# Patient Record
Sex: FEMALE | Race: WHITE | NOT HISPANIC OR LATINO | Employment: FULL TIME | URBAN - METROPOLITAN AREA
[De-identification: names, ages, dates, MRNs, and addresses within clinical notes are randomized per-mention and may not be internally consistent; named-entity substitution may affect disease eponyms.]

---

## 2022-07-11 ENCOUNTER — HOSPITAL ENCOUNTER (OUTPATIENT)
Dept: LAB | Facility: MEDICAL CENTER | Age: 52
End: 2022-07-11
Attending: PHYSICIAN ASSISTANT
Payer: COMMERCIAL

## 2022-07-11 ENCOUNTER — HOSPITAL ENCOUNTER (OUTPATIENT)
Dept: RADIOLOGY | Facility: MEDICAL CENTER | Age: 52
End: 2022-07-11
Attending: PHYSICIAN ASSISTANT
Payer: COMMERCIAL

## 2022-07-11 ENCOUNTER — OFFICE VISIT (OUTPATIENT)
Dept: MEDICAL GROUP | Facility: LAB | Age: 52
End: 2022-07-11
Payer: COMMERCIAL

## 2022-07-11 VITALS
DIASTOLIC BLOOD PRESSURE: 70 MMHG | BODY MASS INDEX: 43.49 KG/M2 | RESPIRATION RATE: 16 BRPM | SYSTOLIC BLOOD PRESSURE: 126 MMHG | HEIGHT: 65 IN | TEMPERATURE: 98.6 F | HEART RATE: 73 BPM | OXYGEN SATURATION: 100 % | WEIGHT: 261 LBS

## 2022-07-11 DIAGNOSIS — I10 PRIMARY HYPERTENSION: Primary | ICD-10-CM

## 2022-07-11 DIAGNOSIS — G89.29 CHRONIC NECK PAIN: ICD-10-CM

## 2022-07-11 DIAGNOSIS — Z12.11 SCREENING FOR COLORECTAL CANCER: ICD-10-CM

## 2022-07-11 DIAGNOSIS — M54.2 CHRONIC NECK PAIN: ICD-10-CM

## 2022-07-11 DIAGNOSIS — Z00.00 PREVENTATIVE HEALTH CARE: ICD-10-CM

## 2022-07-11 DIAGNOSIS — Z12.12 SCREENING FOR COLORECTAL CANCER: ICD-10-CM

## 2022-07-11 DIAGNOSIS — E55.9 VITAMIN D DEFICIENCY: ICD-10-CM

## 2022-07-11 DIAGNOSIS — Z12.31 ENCOUNTER FOR SCREENING MAMMOGRAM FOR BREAST CANCER: ICD-10-CM

## 2022-07-11 LAB
25(OH)D3 SERPL-MCNC: 33 NG/ML (ref 30–100)
ALBUMIN SERPL BCP-MCNC: 4.2 G/DL (ref 3.2–4.9)
ALBUMIN/GLOB SERPL: 1.3 G/DL
ALP SERPL-CCNC: 86 U/L (ref 30–99)
ALT SERPL-CCNC: 18 U/L (ref 2–50)
ANION GAP SERPL CALC-SCNC: 11 MMOL/L (ref 7–16)
AST SERPL-CCNC: 16 U/L (ref 12–45)
BASOPHILS # BLD AUTO: 0.6 % (ref 0–1.8)
BASOPHILS # BLD: 0.05 K/UL (ref 0–0.12)
BILIRUB SERPL-MCNC: 0.3 MG/DL (ref 0.1–1.5)
BUN SERPL-MCNC: 16 MG/DL (ref 8–22)
CALCIUM SERPL-MCNC: 9.2 MG/DL (ref 8.5–10.5)
CHLORIDE SERPL-SCNC: 100 MMOL/L (ref 96–112)
CHOLEST SERPL-MCNC: 197 MG/DL (ref 100–199)
CO2 SERPL-SCNC: 26 MMOL/L (ref 20–33)
CREAT SERPL-MCNC: 0.71 MG/DL (ref 0.5–1.4)
EOSINOPHIL # BLD AUTO: 0.21 K/UL (ref 0–0.51)
EOSINOPHIL NFR BLD: 2.7 % (ref 0–6.9)
ERYTHROCYTE [DISTWIDTH] IN BLOOD BY AUTOMATED COUNT: 44.7 FL (ref 35.9–50)
EST. AVERAGE GLUCOSE BLD GHB EST-MCNC: 123 MG/DL
GFR SERPLBLD CREATININE-BSD FMLA CKD-EPI: 101 ML/MIN/1.73 M 2
GLOBULIN SER CALC-MCNC: 3.2 G/DL (ref 1.9–3.5)
GLUCOSE SERPL-MCNC: 101 MG/DL (ref 65–99)
HBA1C MFR BLD: 5.9 % (ref 4–5.6)
HCT VFR BLD AUTO: 45.2 % (ref 37–47)
HDLC SERPL-MCNC: 57 MG/DL
HGB BLD-MCNC: 14.7 G/DL (ref 12–16)
IMM GRANULOCYTES # BLD AUTO: 0.02 K/UL (ref 0–0.11)
IMM GRANULOCYTES NFR BLD AUTO: 0.3 % (ref 0–0.9)
LDLC SERPL CALC-MCNC: 117 MG/DL
LYMPHOCYTES # BLD AUTO: 2.3 K/UL (ref 1–4.8)
LYMPHOCYTES NFR BLD: 29.3 % (ref 22–41)
MCH RBC QN AUTO: 29.4 PG (ref 27–33)
MCHC RBC AUTO-ENTMCNC: 32.5 G/DL (ref 33.6–35)
MCV RBC AUTO: 90.4 FL (ref 81.4–97.8)
MONOCYTES # BLD AUTO: 0.62 K/UL (ref 0–0.85)
MONOCYTES NFR BLD AUTO: 7.9 % (ref 0–13.4)
NEUTROPHILS # BLD AUTO: 4.65 K/UL (ref 2–7.15)
NEUTROPHILS NFR BLD: 59.2 % (ref 44–72)
NRBC # BLD AUTO: 0 K/UL
NRBC BLD-RTO: 0 /100 WBC
PLATELET # BLD AUTO: 240 K/UL (ref 164–446)
PMV BLD AUTO: 10.2 FL (ref 9–12.9)
POTASSIUM SERPL-SCNC: 4.2 MMOL/L (ref 3.6–5.5)
PROT SERPL-MCNC: 7.4 G/DL (ref 6–8.2)
RBC # BLD AUTO: 5 M/UL (ref 4.2–5.4)
SODIUM SERPL-SCNC: 137 MMOL/L (ref 135–145)
TRIGL SERPL-MCNC: 117 MG/DL (ref 0–149)
TSH SERPL DL<=0.005 MIU/L-ACNC: 1.25 UIU/ML (ref 0.38–5.33)
WBC # BLD AUTO: 7.9 K/UL (ref 4.8–10.8)

## 2022-07-11 PROCEDURE — 82306 VITAMIN D 25 HYDROXY: CPT

## 2022-07-11 PROCEDURE — 99204 OFFICE O/P NEW MOD 45 MIN: CPT | Performed by: PHYSICIAN ASSISTANT

## 2022-07-11 PROCEDURE — 72040 X-RAY EXAM NECK SPINE 2-3 VW: CPT

## 2022-07-11 PROCEDURE — 85025 COMPLETE CBC W/AUTO DIFF WBC: CPT

## 2022-07-11 PROCEDURE — 80053 COMPREHEN METABOLIC PANEL: CPT

## 2022-07-11 PROCEDURE — 36415 COLL VENOUS BLD VENIPUNCTURE: CPT

## 2022-07-11 PROCEDURE — 83036 HEMOGLOBIN GLYCOSYLATED A1C: CPT

## 2022-07-11 PROCEDURE — 84443 ASSAY THYROID STIM HORMONE: CPT

## 2022-07-11 PROCEDURE — 80061 LIPID PANEL: CPT

## 2022-07-11 RX ORDER — METHOCARBAMOL 750 MG/1
750 TABLET, FILM COATED ORAL 4 TIMES DAILY
COMMUNITY
End: 2022-07-11 | Stop reason: SDUPTHER

## 2022-07-11 RX ORDER — TRAMADOL HYDROCHLORIDE 50 MG/1
50 TABLET ORAL 3 TIMES DAILY PRN
COMMUNITY
Start: 2022-05-12 | End: 2022-07-11 | Stop reason: SDUPTHER

## 2022-07-11 RX ORDER — METHOCARBAMOL 750 MG/1
750 TABLET, FILM COATED ORAL 4 TIMES DAILY
Qty: 120 TABLET | Refills: 1 | Status: SHIPPED | OUTPATIENT
Start: 2022-07-11 | End: 2022-12-14

## 2022-07-11 RX ORDER — ATENOLOL 100 MG/1
100 TABLET ORAL DAILY
COMMUNITY
Start: 2022-06-18 | End: 2022-12-20 | Stop reason: SDUPTHER

## 2022-07-11 RX ORDER — TRAMADOL HYDROCHLORIDE 50 MG/1
50 TABLET ORAL EVERY 8 HOURS PRN
Qty: 90 TABLET | Refills: 0 | Status: SHIPPED | OUTPATIENT
Start: 2022-07-11 | End: 2022-08-15 | Stop reason: SDUPTHER

## 2022-07-11 RX ORDER — CHLORTHALIDONE 25 MG/1
25 TABLET ORAL DAILY
COMMUNITY
Start: 2022-05-13 | End: 2022-12-20 | Stop reason: SDUPTHER

## 2022-07-11 NOTE — ASSESSMENT & PLAN NOTE
New to me; chronic  History of vertebral fracture, was in a C-collar for 1 year.   This injury occurred in 2008.   Then 6 months ago fell and is concerned that she re-injured her neck.     Chronic numbness into the R arm/hand.   Pain is worse at night.   History of seeing pain management and evaluation by surgeon many years ago. Was instructed to avoid surgery, PT and Chiro due to location of fractures.   Using Tramadol 1-2x/day.

## 2022-07-11 NOTE — PROGRESS NOTES
Kasia Casanova is a 51 y.o. female new patient here to establish care with new provider, hypertension and chronic neck pain  HPI:    Primary hypertension  New to me; chronic and stable on current regimen.   Denies chest pain or SOB.     Chronic neck pain  New to me; chronic  History of vertebral fracture, was in a C-collar for 1 year.   This injury occurred in 2008.   Then 6 months ago fell and is concerned that she re-injured her neck.     Chronic numbness into the R arm/hand.   Pain is worse at night.   History of seeing pain management and evaluation by surgeon many years ago. Was instructed to avoid surgery, PT and Chiro due to location of fractures.   Using Tramadol 1-2x/day.     Current medicines (including changes today)  Current Outpatient Medications   Medication Sig Dispense Refill   • traMADol (ULTRAM) 50 MG Tab Take 1 Tablet by mouth every 8 hours as needed for Moderate Pain or Severe Pain for up to 30 days. for pain 90 Tablet 0   • methocarbamol (ROBAXIN) 750 MG Tab Take 1 Tablet by mouth 4 times a day. 120 Tablet 1   • atenolol (TENORMIN) 100 MG Tab Take 100 mg by mouth every day.     • chlorthalidone (HYGROTON) 25 MG Tab Take 25 mg by mouth every day.       No current facility-administered medications for this visit.     She  has a past medical history of Hypertension.  She  has a past surgical history that includes primary c section.  Social History     Tobacco Use   • Smoking status: Never Smoker   • Smokeless tobacco: Never Used   Vaping Use   • Vaping Use: Never used   Substance Use Topics   • Alcohol use: Yes     Comment: rare   • Drug use: No     Social History     Social History Narrative   • Not on file     History reviewed. No pertinent family history.  No family status information on file.         ROS  Constitutional: Negative for fever, chills and malaise/fatigue.   HENT: Negative for congestion.    Eyes: Negative for pain.   Respiratory: Negative for cough and shortness of breath.   "  Cardiovascular: Negative for leg swelling.   Skin: Negative for rash.   Neurological: Negative for dizziness, focal weakness and headaches.   Endo/Heme/Allergies: Does not bruise/bleed easily.   Psychiatric/Behavioral: Negative for depression.  The patient is not nervous/anxious.    All other systems reviewed and are negative     Objective:     /70 (BP Location: Left arm, Patient Position: Sitting, BP Cuff Size: Large adult)   Pulse 73   Temp 37 °C (98.6 °F) (Temporal)   Resp 16   Ht 1.651 m (5' 5\")   Wt 118 kg (261 lb)   SpO2 100%  Body mass index is 43.43 kg/m².  Physical Exam:    Constitutional: Alert, no distress.  Skin: Warm, dry, good turgor, no rashes in visible areas.  Eye: Equal, round and reactive, conjunctiva clear, lids normal.  Neck: Trachea midline, no masses, no thyromegaly.  Respiratory: Unlabored respiratory effort, lungs clear to auscultation, no wheezes, no ronchi.  Cardiovascular: Normal S1, S2, no murmur, no edema.  Psych: Alert and oriented x3, normal affect and mood.      Assessment and Plan:   The following treatment plan was discussed    1. Primary hypertension  New to me; chronic and stable on current regimen.   No refills needed at this time.    2. Chronic neck pain  New to me, chronic neck pain status post injury many years ago..  Does feel like she reinjured her neck about 6 months ago.   We will obtain updated neck x-ray.  Refill for tramadol and methocarbamol as written.  Should continue IBU/Tylenol prn more mild to moderate pain.   Pt was educated on use and SEs of medication.  She is aware that these can cause drowsiness and are not to be used with any other sedating substance or alcohol.   Pt expressed verbal understanding. Aware that these are habit forming and he is to notify me if she is starting to use/need more/higher dose, in which case we would want to seek other forms of pain control.   - traMADol (ULTRAM) 50 MG Tab; Take 1 Tablet by mouth every 8 hours as " needed for Moderate Pain or Severe Pain for up to 30 days. for pain  Dispense: 90 Tablet; Refill: 0  - methocarbamol (ROBAXIN) 750 MG Tab; Take 1 Tablet by mouth 4 times a day.  Dispense: 120 Tablet; Refill: 1  - DX-CERVICAL SPINE-2 OR 3 VIEWS; Future    3. Preventative health care  New patient today, due for routine, fasting labs  - Lipid Profile; Future  - Comp Metabolic Panel; Future  - CBC WITH DIFFERENTIAL; Future  - HEMOGLOBIN A1C; Future  - TSH WITH REFLEX TO FT4; Future    4. Vitamin D deficiency  - VITAMIN D,25 HYDROXY; Future    5. Encounter for screening mammogram for breast cancer  - MA-SCREENING MAMMO BILAT W/TOMOSYNTHESIS W/CAD; Future    6. Screening for colorectal cancer  - COLOGUARD (FIT DNA)      Records requested.  Followup: Return in about 3 months (around 10/11/2022), or if symptoms worsen or fail to improve.       Martine Koch P.A.-C.  Supervising MD: Dr. Valentino Tee MD  07/11/22

## 2022-07-12 PROBLEM — E78.00 ELEVATED LDL CHOLESTEROL LEVEL: Status: ACTIVE | Noted: 2022-07-12

## 2022-07-12 PROBLEM — R73.03 PREDIABETES: Status: ACTIVE | Noted: 2022-07-12

## 2022-08-15 DIAGNOSIS — G89.29 CHRONIC NECK PAIN: ICD-10-CM

## 2022-08-15 DIAGNOSIS — M54.2 CHRONIC NECK PAIN: ICD-10-CM

## 2022-08-16 RX ORDER — TRAMADOL HYDROCHLORIDE 50 MG/1
50 TABLET ORAL EVERY 8 HOURS PRN
Qty: 90 TABLET | Refills: 0 | Status: SHIPPED | OUTPATIENT
Start: 2022-08-16 | End: 2022-09-09 | Stop reason: SDUPTHER

## 2022-09-09 ENCOUNTER — OFFICE VISIT (OUTPATIENT)
Dept: MEDICAL GROUP | Facility: LAB | Age: 52
End: 2022-09-09
Payer: COMMERCIAL

## 2022-09-09 VITALS
SYSTOLIC BLOOD PRESSURE: 124 MMHG | TEMPERATURE: 98.6 F | BODY MASS INDEX: 43.27 KG/M2 | DIASTOLIC BLOOD PRESSURE: 80 MMHG | HEART RATE: 60 BPM | RESPIRATION RATE: 18 BRPM | HEIGHT: 65 IN | OXYGEN SATURATION: 99 % | WEIGHT: 259.7 LBS

## 2022-09-09 DIAGNOSIS — G89.29 CHRONIC NECK PAIN: ICD-10-CM

## 2022-09-09 DIAGNOSIS — M54.2 CHRONIC NECK PAIN: ICD-10-CM

## 2022-09-09 PROCEDURE — 99213 OFFICE O/P EST LOW 20 MIN: CPT | Performed by: PHYSICIAN ASSISTANT

## 2022-09-09 RX ORDER — TRAMADOL HYDROCHLORIDE 50 MG/1
50 TABLET ORAL EVERY 8 HOURS PRN
Qty: 90 TABLET | Refills: 0 | Status: SHIPPED | OUTPATIENT
Start: 2022-09-16 | End: 2022-10-20 | Stop reason: SDUPTHER

## 2022-09-09 ASSESSMENT — FIBROSIS 4 INDEX: FIB4 SCORE: 0.83

## 2022-09-09 NOTE — ASSESSMENT & PLAN NOTE
Follow-up, chronic.  Unfortunately, pain is becoming worse.  Somewhat stable on tramadol 50 mg every 8 hours.  Initially was not interested in seeing pain management, however since pain seems to be worsening, she is more open to this referral

## 2022-09-09 NOTE — PROGRESS NOTES
"Subjective:   CC: Kasia Casanova is a 51 y.o. female here today for medication management    HPI:  Chronic neck pain  Follow-up, chronic.  Unfortunately, pain is becoming worse.  Somewhat stable on tramadol 50 mg every 8 hours.  Initially was not interested in seeing pain management, however since pain seems to be worsening, she is more open to this referral     Current medicines (including changes today)  Current Outpatient Medications   Medication Sig Dispense Refill    [START ON 9/16/2022] traMADol (ULTRAM) 50 MG Tab Take 1 Tablet by mouth every 8 hours as needed for Moderate Pain or Severe Pain for up to 30 days. for pain 90 Tablet 0    atenolol (TENORMIN) 100 MG Tab Take 100 mg by mouth every day.      chlorthalidone (HYGROTON) 25 MG Tab Take 25 mg by mouth every day.      methocarbamol (ROBAXIN) 750 MG Tab Take 1 Tablet by mouth 4 times a day. 120 Tablet 1     No current facility-administered medications for this visit.     She  has a past medical history of Hypertension.    ROS   No chest pain, no shortness of breath, no abdominal pain       Objective:     /80 (BP Location: Left arm, Patient Position: Sitting, BP Cuff Size: Large adult)   Pulse 60   Temp 37 °C (98.6 °F) (Temporal)   Resp 18   Ht 1.651 m (5' 5\")   Wt 118 kg (259 lb 11.2 oz)   SpO2 99%  Body mass index is 43.22 kg/m².   Physical Exam:  Constitutional: Alert, no distress.  Skin: Warm, dry, good turgor, no rashes in visible areas.  Eye: Equal, round, conjunctiva clear, lids normal.  Neck: Trachea midline, no masses,  Respiratory: Unlabored respiratory effort,  Psych: Alert and oriented x3, normal affect and mood.    Assessment and Plan:   The following treatment plan was discussed    1. Chronic neck pain  Follow-up, chronic.  X-ray was reviewed today.  PDMP has been reviewed.  Refill for tramadol as written.  Also recommend referral to pain management given worsening symptoms.  She is more open to this plan at this time.  Follow-up in " 1 month, of course sooner as needed  - Referral to Pain Management  - traMADol (ULTRAM) 50 MG Tab; Take 1 Tablet by mouth every 8 hours as needed for Moderate Pain or Severe Pain for up to 30 days. for pain  Dispense: 90 Tablet; Refill: 0  - Consent for Opiate Prescription  - Controlled Substance Treatment Agreement      Followup: Return in about 4 weeks (around 10/7/2022).       Martine Koch P.A.-C.  Supervising MD: Dr. Valentino Tee MD  09/09/22

## 2022-10-12 ENCOUNTER — HOSPITAL ENCOUNTER (OUTPATIENT)
Dept: RADIOLOGY | Facility: MEDICAL CENTER | Age: 52
End: 2022-10-12
Payer: COMMERCIAL

## 2022-10-20 DIAGNOSIS — G89.29 CHRONIC NECK PAIN: ICD-10-CM

## 2022-10-20 DIAGNOSIS — M54.2 CHRONIC NECK PAIN: ICD-10-CM

## 2022-10-20 NOTE — TELEPHONE ENCOUNTER
Received request via: Pharmacy    Was the patient seen in the last year in this department? Yes  9/9/22  Does the patient have an active prescription (recently filled or refills available) for medication(s) requested? No

## 2022-10-21 ENCOUNTER — HOSPITAL ENCOUNTER (OUTPATIENT)
Dept: RADIOLOGY | Facility: MEDICAL CENTER | Age: 52
End: 2022-10-21
Attending: PHYSICIAN ASSISTANT
Payer: COMMERCIAL

## 2022-10-21 ENCOUNTER — OFFICE VISIT (OUTPATIENT)
Dept: PHYSICAL MEDICINE AND REHAB | Facility: MEDICAL CENTER | Age: 52
End: 2022-10-21
Payer: COMMERCIAL

## 2022-10-21 VITALS
DIASTOLIC BLOOD PRESSURE: 88 MMHG | OXYGEN SATURATION: 97 % | HEIGHT: 65 IN | WEIGHT: 261.47 LBS | SYSTOLIC BLOOD PRESSURE: 138 MMHG | HEART RATE: 60 BPM | BODY MASS INDEX: 43.56 KG/M2 | TEMPERATURE: 98.7 F

## 2022-10-21 DIAGNOSIS — Z87.81 H/O CERVICAL FRACTURE: ICD-10-CM

## 2022-10-21 DIAGNOSIS — R20.2 NUMBNESS AND TINGLING IN BOTH HANDS: ICD-10-CM

## 2022-10-21 DIAGNOSIS — M79.10 MYALGIA: ICD-10-CM

## 2022-10-21 DIAGNOSIS — M75.41 IMPINGEMENT SYNDROME OF RIGHT SHOULDER: ICD-10-CM

## 2022-10-21 DIAGNOSIS — R20.0 NUMBNESS AND TINGLING IN BOTH HANDS: ICD-10-CM

## 2022-10-21 DIAGNOSIS — M54.2 CERVICALGIA: ICD-10-CM

## 2022-10-21 DIAGNOSIS — Z12.31 ENCOUNTER FOR SCREENING MAMMOGRAM FOR BREAST CANCER: ICD-10-CM

## 2022-10-21 PROCEDURE — 77063 BREAST TOMOSYNTHESIS BI: CPT

## 2022-10-21 PROCEDURE — 99204 OFFICE O/P NEW MOD 45 MIN: CPT | Performed by: STUDENT IN AN ORGANIZED HEALTH CARE EDUCATION/TRAINING PROGRAM

## 2022-10-21 RX ORDER — TIZANIDINE 2 MG/1
1-2 TABLET ORAL NIGHTLY PRN
Qty: 30 TABLET | Refills: 2 | Status: SHIPPED | OUTPATIENT
Start: 2022-10-21 | End: 2023-02-11 | Stop reason: SDUPTHER

## 2022-10-21 RX ORDER — TRAMADOL HYDROCHLORIDE 50 MG/1
50 TABLET ORAL EVERY 4 HOURS PRN
COMMUNITY
End: 2022-12-14

## 2022-10-21 RX ORDER — TRAMADOL HYDROCHLORIDE 50 MG/1
50 TABLET ORAL EVERY 8 HOURS PRN
Qty: 90 TABLET | Refills: 0 | Status: SHIPPED | OUTPATIENT
Start: 2022-10-21 | End: 2022-11-20

## 2022-10-21 RX ORDER — M-VIT,TX,IRON,MINS/CALC/FOLIC 27MG-0.4MG
1 TABLET ORAL DAILY
COMMUNITY

## 2022-10-21 ASSESSMENT — PAIN SCALES - GENERAL: PAINLEVEL: 5=MODERATE PAIN

## 2022-10-21 ASSESSMENT — FIBROSIS 4 INDEX: FIB4 SCORE: 0.83

## 2022-10-21 ASSESSMENT — PATIENT HEALTH QUESTIONNAIRE - PHQ9: CLINICAL INTERPRETATION OF PHQ2 SCORE: 0

## 2022-10-21 NOTE — PROGRESS NOTES
New Patient Note    Interventional Pain and Spine  Physiatry (Physical Medicine and Rehabilitation)     Patient Name: Kasia Casanova  : 1970  Date of Service: 10/21/2022  PCP: Martine Koch P.A.-C.  Referring Provider: Martine Koch,*    Chief Complaint:   Chief Complaint   Patient presents with    New Patient     Chronic neck pain       HPI  HISTORY (10/21/2022):  Kasia Casanova is a 51 y.o. RHD female  who presents today with neck pain. She endorses a sensation of a stabbing pain at the back of her neck near the bony prominence. Neck pain started after sustaining burst fx of C7 from a diving injury around 15 years ago. She remembers a surgeon at that time told her that her fx was nonoperable and she has managed this conservatively.    Pain right now is 5/10 on the numeric pain scale. Her pain at its best-worse level during the course of the day is typically 4-7/10, respectively.  Pain worsens with sitting, standing, walking, coughing, and sneezing and improves with laying down. Pain worsens at night.  Her pain interferes somewhat with ADLs and ability to join her family on quads and ability to sleep. The patient endorses new right shoulder pain and numbness/tingling in her fingers (R>L) that seems to be worsening. States she has numbness worst at her right index finger. Denies radiating pain from her neck to her hands. Has not had an EMG before.     The patient has done physical therapy for this problem, most recently years ago. Not doing home exercise program.     Patient has tried the following medications with varied success (current meds in bold):   Tramadol 50mg PRN (has been gradually weaned over the years) typically takes 1-2 tabs per day as prescribed by PCP, sometimes 3 tabs per day when needed  Tylenol - SE increased liver enzymes  Ibuprofen - SE GI upset  Robaxin - no benefit  Tizanidine (unknown dose) - benefit in the past, unwanted SE drowsiness at unknown  dose    Therapeutic modalities and interventional therapies to date include:  -no injections  No massage or chiropractic    Medical history includes hypertension, prediabetes    Psychological testing for pain as depression and pain commonly coexist and need to both be treated.     Opioid Risk Score: 0      Interpretation of Opioid Risk Score   Score 0-3 = Low risk of abuse. Do UDS at least once per year.  Score 4-7 = Moderate risk of abuse. Do UDS 1-4 times per year.  Score 8+ = High risk of abuse. Refer to specialist.    PHQ  Depression Screen (PHQ-2/PHQ-9) 10/21/2022   PHQ-2 Total Score 0       Interpretation of PHQ-9 Total Score   Score Severity   1-4 No Depression   5-9 Mild Depression   10-14 Moderate Depression   15-19 Moderately Severe Depression   20-27 Severe Depression      Medical records review:  I reviewed the note from the referring provider Martine Koch,* including the note dated 22.    ROS:   Red Flags ROS:   Fever, Chills, Sweats: Denies  Involuntary Weight Loss: Denies  Bladder Incontinence: Denies  Bowel Incontinence: denies  Saddle Anesthesia: Denies    All other systems reviewed and negative.     PMHx:   Past Medical History:   Diagnosis Date    Hypertension        PSHx:   Past Surgical History:   Procedure Laterality Date    PRIMARY C SECTION             Family Hx:   History reviewed. No pertinent family history.    Social Hx:  Social History     Socioeconomic History    Marital status:      Spouse name: Not on file    Number of children: Not on file    Years of education: Not on file    Highest education level: Not on file   Occupational History    Not on file   Tobacco Use    Smoking status: Never    Smokeless tobacco: Never   Vaping Use    Vaping Use: Never used   Substance and Sexual Activity    Alcohol use: Yes     Comment: less than 1 per week    Drug use: No    Sexual activity: Not on file   Other Topics Concern    Not on file   Social History Narrative     Not on file     Social Determinants of Health     Financial Resource Strain: Not on file   Food Insecurity: Not on file   Transportation Needs: Not on file   Physical Activity: Not on file   Stress: Not on file   Social Connections: Not on file   Intimate Partner Violence: Not on file   Housing Stability: Not on file       Allergies:  No Known Allergies    Medications: reviewed on epic.   Outpatient Medications Marked as Taking for the 10/21/22 encounter (Office Visit) with Cristina Raymond M.D.   Medication Sig Dispense Refill    traMADol (ULTRAM) 50 MG Tab Take 50 mg by mouth every four hours as needed.      therapeutic multivitamin-minerals (THERAGRAN-M) Tab Take 1 Tablet by mouth every day.      tizanidine (ZANAFLEX) 2 MG tablet Take 0.5-1 Tablets by mouth at bedtime as needed (muscle spasms). 30 Tablet 2    atenolol (TENORMIN) 100 MG Tab Take 100 mg by mouth every day.      chlorthalidone (HYGROTON) 25 MG Tab Take 25 mg by mouth every day.      methocarbamol (ROBAXIN) 750 MG Tab Take 1 Tablet by mouth 4 times a day. 120 Tablet 1        Current Outpatient Medications on File Prior to Visit   Medication Sig Dispense Refill    traMADol (ULTRAM) 50 MG Tab Take 50 mg by mouth every four hours as needed.      therapeutic multivitamin-minerals (THERAGRAN-M) Tab Take 1 Tablet by mouth every day.      atenolol (TENORMIN) 100 MG Tab Take 100 mg by mouth every day.      chlorthalidone (HYGROTON) 25 MG Tab Take 25 mg by mouth every day.      methocarbamol (ROBAXIN) 750 MG Tab Take 1 Tablet by mouth 4 times a day. 120 Tablet 1    traMADol (ULTRAM) 50 MG Tab Take 1 Tablet by mouth every 8 hours as needed for Moderate Pain or Severe Pain for up to 30 days. for pain 90 Tablet 0     No current facility-administered medications on file prior to visit.         EXAMINATION     Physical Exam:   /88 (BP Location: Right arm, Patient Position: Sitting, BP Cuff Size: Adult long)   Pulse 60   Temp 37.1 °C (98.7 °F) (Temporal)    "Ht 1.651 m (5' 5\")   Wt 119 kg (261 lb 7.5 oz)   SpO2 97%     Constitutional:   Body Habitus: Body mass index is 43.51 kg/m².  Cooperation: Fully cooperates with exam  Appearance: Well-groomed, well-nourished.    Eyes: No scleral icterus to suggest severe liver disease, no proptosis to suggest severe hyperthyroidism    ENT -no obvious auditory deficits, no noticeable facial droop     Skin -no rashes or lesions noted     Respiratory-  breathing comfortably on room air, no audible wheezing    Cardiovascular-distal extremities warm and well perfused.  No lower extremity edema is noted.     Gastrointestinal - no obvious abdominal masses, non-distended    Psychiatric- alert and oriented ×3. Normal affect.     Gait - normal gait, no use of ambulatory device, nonantalgic.  Tandem gait and heel walking and toe walking intact.    Musculoskeletal and Neuro -     Cervical spine   Inspection: No deformities of the skin over the cervical spine. No rashes or lesions.    limited active range of motion in all directions especially sidebending to the left and left cervical extension    Spurling's sign  positive on left, negative on right  Cervical facet loading maneuver  negative bilaterally    No signs of muscular atrophy in bilateral upper extremities     Tenderness to palpation at  over C7 spinous process , bilateral paracervical muscles, bilateral upper trapezius.     Key points for the international standards for neurological classification of spinal cord injury (ISNCSCI) to light touch.     Dermatome R L   C4 2 2   C5 2 2   C6 2 2   C7 2 2   C8 2 2   T1 2 2   T2 2 2       Motor Exam Upper Extremities   ? Myotome R L   Shoulder abduction C5 5 5   Elbow flexion C5 5 5   Wrist extension C6 5 5   Elbow extension C7 5 5   Finger flexion C8 5 5   Finger abduction T1 5 5     Reflexes  ?  R L   Biceps  2+ 2+   Brachioradialis  2+ 2+     Armas's sign negative bilaterally     Bilateral hands:   Inspection: No swelling, deformities, " or rashes. Symmetric appearing thenar and hyperthenar regions bilaterally.    Special tests:  Tinel's at the wrist over the median nerve negative bilaterally  Carpal tunnel compression: negative bilaterally  Phalen's test: negative bilaterally  Tinel's at the cubital tunnel: negative bilaterally    RIGHT SHOULDER EXAM  Inspection: no evidence of muscular atrophy or scapular winging. No evidence of erythema, effusion, or bruising. Humerus is not grossly high-riding when comparing the right and left sides  Palpation: tenderness to palpation at supraspinatus, otherwise no tenderness to palpation at anterior glenohumeral joint, posterior glenohumeral joint, greater tuberosity, lesser tuberosity, acromioclavicular joint, and bicipital groove.     Active ROM: Full with flexion, abduction, internal rotation, external rotation    Provocative testing:   RIGHT   Neer -   Monique +   Drop arm -   Empty can +   Resisted external rotation -   Resisted internal rotation -   Lift off test -   Speeds -   Yergasons -   Crossed body adduction -   O'Briens -       MEDICAL DECISION MAKING    Medical records review: see under HPI section.     DATA    Labs: Personally reviewed at today's visit:     Lab Results   Component Value Date/Time    SODIUM 137 07/11/2022 10:54 AM    POTASSIUM 4.2 07/11/2022 10:54 AM    CHLORIDE 100 07/11/2022 10:54 AM    CO2 26 07/11/2022 10:54 AM    ANION 11.0 07/11/2022 10:54 AM    GLUCOSE 101 (H) 07/11/2022 10:54 AM    BUN 16 07/11/2022 10:54 AM    CREATININE 0.71 07/11/2022 10:54 AM    CALCIUM 9.2 07/11/2022 10:54 AM    ASTSGOT 16 07/11/2022 10:54 AM    ALTSGPT 18 07/11/2022 10:54 AM    TBILIRUBIN 0.3 07/11/2022 10:54 AM    ALBUMIN 4.2 07/11/2022 10:54 AM    TOTPROTEIN 7.4 07/11/2022 10:54 AM    GLOBULIN 3.2 07/11/2022 10:54 AM    AGRATIO 1.3 07/11/2022 10:54 AM       No results found for: PROTHROMBTM, INR     Lab Results   Component Value Date/Time    WBC 7.9 07/11/2022 10:54 AM    RBC 5.00 07/11/2022 10:54  AM    HEMOGLOBIN 14.7 07/11/2022 10:54 AM    HEMATOCRIT 45.2 07/11/2022 10:54 AM    MCV 90.4 07/11/2022 10:54 AM    MCH 29.4 07/11/2022 10:54 AM    MCHC 32.5 (L) 07/11/2022 10:54 AM    MPV 10.2 07/11/2022 10:54 AM    NEUTSPOLYS 59.20 07/11/2022 10:54 AM    LYMPHOCYTES 29.30 07/11/2022 10:54 AM    MONOCYTES 7.90 07/11/2022 10:54 AM    EOSINOPHILS 2.70 07/11/2022 10:54 AM    BASOPHILS 0.60 07/11/2022 10:54 AM        Lab Results   Component Value Date/Time    HBA1C 5.9 (H) 07/11/2022 10:54 AM        Imaging:   I personally reviewed following images, these are my reads  MRI cervical spine 3/19/2008  C7 burst fracture with slight cord abutment.  Small central disc protrusion at C5-6 with slight cord abutment. Moderate bilateral neuroforaminal stenosis at C5-6. See formal radiology report for further details.    X-ray cervical spine 7/11/2022  Mild anterior listhesis of C2 on C3, C3 on C4, and C4 on C5.  Multilevel facet arthropathy. See formal radiology report for further details.        IMAGING radiology reads. I reviewed the following radiology reads                  Results for orders placed during the hospital encounter of 03/19/08    MR-CERVICAL SPINE-W/O    Impression  IMPRESSION:    1. CHRONIC MILD TO MODERATE C7 BURST FRACTURE WITH RETROPULSION OF THE  POSTERIOR SUPERIOR ASPECT OF THE C7 VERTEBRAL BODY WHICH ABUTS THE  VENTRAL ASPECT OF THE CORD WITHOUT SIGNIFICANT INTERVAL CHANGE SINCE  PREVIOUS EXAM EXCEPT FOR DECREASED BONE MARROW EDEMA ON THE CURRENT EXAM.    2. SMALL CENTRAL DISC PROTRUSION AT THE C5-6 LEVEL WHICH ABUTS THE  VENTRAL SURFACE OF THE CORD WITHOUT SIGNIFICANT INTERVAL CHANGE SINCE THE  PREVIOUS EXAM.      AJB/llw                                                 Results for orders placed during the hospital encounter of 07/11/22    DX-CERVICAL SPINE-2 OR 3 VIEWS    Impression  1.  Multilevel degenerative disc disease and facet degeneration.    2.  Mild multilevel degenerative subluxation.    3.   Limited visualization of the patient's known chronic C7 fracture due to obscuration by overlapping soft tissues on lateral view.                                                Diagnosis  Visit Diagnoses     ICD-10-CM   1. Numbness and tingling in both hands  R20.0    R20.2   2. Impingement syndrome of right shoulder  M75.41   3. Cervicalgia  M54.2   4. Myalgia  M79.10   5. H/O C7 burst fracture  Z87.81         ASSESSMENT AND PLAN:  Kasia Casanova ( 1970) is a female with history of C7 burst fracture approximately 15 years ago who presents with worsening neck pain and numbness and tingling in bilateral fingers with a positive left Spurling's.  She also has significant myofascial and myalgia pain at her bilateral paracervical muscles and upper trapezius.     Kasia was seen today for new patient.    Diagnoses and all orders for this visit:    Numbness and tingling in both hands  -     MR-CERVICAL SPINE-W/O; Future    Impingement syndrome of right shoulder    Cervicalgia  -     MR-CERVICAL SPINE-W/O; Future    Myalgia  -     Referral to Pain Clinic    H/O C7 burst fracture  -     MR-CERVICAL SPINE-W/O; Future    Other orders  -     tizanidine (ZANAFLEX) 2 MG tablet; Take 0.5-1 Tablets by mouth at bedtime as needed (muscle spasms).        PLAN  Physical Therapy: Discussed my recommendation for physical therapy referral, patient declined stating she would rather not go due to logistical reasons of PT being relatively unavailable near her in [place]    Diagnostic workup: as above and Personally reviewed at today's visit: MRI cervical spine 3/19/2008 and X-ray cervical spine 2022    Medications:   - given the myofascial component of pain, I will start tizanidine as above.  She does not remember which dose of tizanidine she took in the past which made her feel drowsy.  Discussed that I will prescribe a small dose, 2 mg, and she may start by taking 1 mg at bedtime.  Counseled on possible side effect of  drowsiness and dizziness and discussed that the patient should discontinue this if the side effects are too severe.    -Discussed that at this time my office is not accepting new referrals for chronic narcotic management.  It seems that tramadol 50 mg daily-3 times daily as needed as prescribed by her PCP who just retired has enabled her to perform ADLs and she has been on this medication for 15 years.  States that she has about 30 tablets left and she does not have an appointment with her new PCP yet.  Discussed that I will message her PCP with a summary of today's visit    Interventions:   Trigger point injections under ultrasound guidance. The risks, benefits, and alternatives to this procedure were discussed and the patient wishes to proceed with the procedure. Risks include but are not limited to damage to surrounding structures, infection, bleeding, worsening of pain which can be permanent, and collapsed lung. Benefits include pain relief and improved function. Alternatives include not doing the procedure.  -Could consider other cervical interventions pending MRI review    Other   - Discussed that we could consider EMG if MRI unrevealing for a potential cause of numbness/tingling in her fingers (R>L)   - Messaged her new PCP Latoya IGLESIAS with a summary of today's visit.     Follow-up: 2 weeks for trigger point injections above    Orders Placed This Encounter    MR-CERVICAL SPINE-W/O    Referral to Pain Clinic    traMADol (ULTRAM) 50 MG Tab    therapeutic multivitamin-minerals (THERAGRAN-M) Tab    tizanidine (ZANAFLEX) 2 MG tablet       Cristina Raymond MD  Interventional Pain and Spine  Physical Medicine and Rehabilitation  [facility]    CC Latoya IGLESIAS    The above note documents my personal evaluation of this patient. In addition, I have reviewed and confirmed with the patient and MA the supportive information documented in today's Patient Health Questionnaire and Office Note.     Please note  that this dictation was created using voice recognition software. I have made every reasonable attempt to correct obvious errors, but I expect that there are errors of grammar and possibly content that I did not discover before finalizing the note.

## 2022-10-21 NOTE — Clinical Note
Chauncey Klein,  I just met a new patient Kasia today who was referred by Martine Koch. Kasia mentioned that she will be establishing care with you as her new PCP since Martine has left.  She states that Martine has prescribed her tramadol for years (50mg daily-TID PRN) and she was under the impression that either I or a new PCP would take over this prescription.  I discussed that at this time my office is not accepting referrals for chronic narcotic management.  She states she has about 30 days left.    I encouraged her to schedule an appointment with you within that timeframe.  Please let me know if there would be any issues getting her in in that timeframe.  If necessary, I can prescribe her a short course of tramadol until she could see you.  In regards other aspects of her care, I will be seeing her back in a few weeks for trigger point injections.  I ordered a cervical MRI to assess for cervical nerve impingement to help guide our management. I also prescribed tizanidine.  Thanks, Crisitna

## 2022-11-19 ENCOUNTER — HOSPITAL ENCOUNTER (OUTPATIENT)
Dept: RADIOLOGY | Facility: MEDICAL CENTER | Age: 52
End: 2022-11-19
Attending: STUDENT IN AN ORGANIZED HEALTH CARE EDUCATION/TRAINING PROGRAM
Payer: COMMERCIAL

## 2022-11-19 DIAGNOSIS — R20.0 NUMBNESS AND TINGLING IN BOTH HANDS: ICD-10-CM

## 2022-11-19 DIAGNOSIS — M54.2 CERVICALGIA: ICD-10-CM

## 2022-11-19 DIAGNOSIS — R20.2 NUMBNESS AND TINGLING IN BOTH HANDS: ICD-10-CM

## 2022-11-19 DIAGNOSIS — Z87.81 H/O CERVICAL FRACTURE: ICD-10-CM

## 2022-11-19 PROCEDURE — 72141 MRI NECK SPINE W/O DYE: CPT

## 2022-11-22 ENCOUNTER — TELEPHONE (OUTPATIENT)
Dept: PHYSICAL MEDICINE AND REHAB | Facility: MEDICAL CENTER | Age: 52
End: 2022-11-22
Payer: COMMERCIAL

## 2022-11-22 NOTE — TELEPHONE ENCOUNTER
Phone Number Called: [phone]    Call outcome: Spoke to patient regarding message below.    Message: Called pt in effort to schedule MRI review, but she already had an appt sooner than anything else currently available, on 12/2 intended for TPI.     Kasia did ask if we could relay any information regarding her MR Cervical Spine findings via [facility] message or phone prior to then. I let her know we would get back to her with Dr. Raymond's thoughts.

## 2022-11-23 NOTE — TELEPHONE ENCOUNTER
I reviewed her MRI and there is nothing concerning that I think needs to be addressed prior to 12/2. The height of her chronic burst fracture has improved since her last MRI which is good news. We can review the images together at her next appt

## 2022-12-02 ENCOUNTER — OFFICE VISIT (OUTPATIENT)
Dept: PHYSICAL MEDICINE AND REHAB | Facility: MEDICAL CENTER | Age: 52
End: 2022-12-02
Payer: COMMERCIAL

## 2022-12-02 VITALS
WEIGHT: 261.69 LBS | SYSTOLIC BLOOD PRESSURE: 136 MMHG | HEART RATE: 57 BPM | DIASTOLIC BLOOD PRESSURE: 86 MMHG | OXYGEN SATURATION: 98 % | HEIGHT: 65 IN | BODY MASS INDEX: 43.6 KG/M2 | TEMPERATURE: 97.7 F

## 2022-12-02 DIAGNOSIS — M79.10 MYALGIA: Primary | ICD-10-CM

## 2022-12-02 PROCEDURE — 76942 ECHO GUIDE FOR BIOPSY: CPT | Performed by: STUDENT IN AN ORGANIZED HEALTH CARE EDUCATION/TRAINING PROGRAM

## 2022-12-02 PROCEDURE — 20553 NJX 1/MLT TRIGGER POINTS 3/>: CPT | Performed by: STUDENT IN AN ORGANIZED HEALTH CARE EDUCATION/TRAINING PROGRAM

## 2022-12-02 PROCEDURE — 99214 OFFICE O/P EST MOD 30 MIN: CPT | Mod: 25 | Performed by: STUDENT IN AN ORGANIZED HEALTH CARE EDUCATION/TRAINING PROGRAM

## 2022-12-02 ASSESSMENT — PATIENT HEALTH QUESTIONNAIRE - PHQ9: CLINICAL INTERPRETATION OF PHQ2 SCORE: 0

## 2022-12-02 ASSESSMENT — FIBROSIS 4 INDEX: FIB4 SCORE: 0.85

## 2022-12-02 ASSESSMENT — PAIN SCALES - GENERAL: PAINLEVEL: 5=MODERATE PAIN

## 2022-12-02 NOTE — PROGRESS NOTES
Follow-up patient Note    Interventional Pain and Spine  Physiatry (Physical Medicine and Rehabilitation)     Patient Name: Kasia Casanova  : 1970  Date of service: 2022    Chief Complaint:   Chief Complaint   Patient presents with    Follow-Up     Neck pain     HISTORY (10/21/2022):  Kasia Casanova is a 51 y.o. RHD female  who presents today with neck pain. She endorses a sensation of a stabbing pain at the back of her neck near the bony prominence. Neck pain started after sustaining burst fx of C7 from a diving injury around 15 years ago. She remembers a surgeon at that time told her that her fx was nonoperable and she has managed this conservatively.     Pain right now is 5/10 on the numeric pain scale. Her pain at its best-worse level during the course of the day is typically 4-7/10, respectively.  Pain worsens with sitting, standing, walking, coughing, and sneezing and improves with laying down. Pain worsens at night.  Her pain interferes somewhat with ADLs and ability to join her family on quads and ability to sleep. The patient endorses new right shoulder pain and numbness/tingling in her fingers (R>L) that seems to be worsening. States she has numbness worst at her right index finger. Denies radiating pain from her neck to her hands. Has not had an EMG before.      The patient has done physical therapy for this problem, most recently years ago. Not doing home exercise program.      Patient has tried the following medications with varied success (current meds in bold):   Tramadol 50mg PRN (has been gradually weaned over the years) typically takes 1-2 tabs per day as prescribed by PCP, sometimes 3 tabs per day when needed  Tylenol - SE increased liver enzymes  Ibuprofen - SE GI upset  Robaxin - no benefit  Tizanidine (unknown dose) - benefit in the past, unwanted SE drowsiness at unknown dose     Therapeutic modalities and interventional therapies to date include:  -no  injections  No massage or chiropractic     Medical history includes hypertension, prediabetes       HPI  Today's visit   Kasia Casanova ( 1970) is a female with The encounter diagnosis was Myalgia.    Notes ongoing pain at upper back and impaired sensation at bilateral index fingers, worst on right that is overall manageable. Denies significant pain or focal weakness.     Taking tizanidine with some side effects of vivid dreams, overall tolerable and helping her sleep. Would like to continue.    Pain severity 5/10 currently  Pt denies new numbness, tingling, or weakness.      ROS:   Red Flags ROS:   Fever, Chills, Sweats: Denies  Involuntary Weight Loss: Denies  Bladder Incontinence: Denies  Bowel Incontinence: denies  Saddle Anesthesia: Denies    All other systems reviewed and negative.     PMHx:   Past Medical History:   Diagnosis Date    Hypertension        PSHx:   Past Surgical History:   Procedure Laterality Date    PRIMARY C SECTION             Family Hx:   History reviewed. No pertinent family history.    Social Hx:  Social History     Socioeconomic History    Marital status:      Spouse name: Not on file    Number of children: Not on file    Years of education: Not on file    Highest education level: Not on file   Occupational History    Not on file   Tobacco Use    Smoking status: Never    Smokeless tobacco: Never   Vaping Use    Vaping Use: Never used   Substance and Sexual Activity    Alcohol use: Yes     Comment: less than 1 per week    Drug use: No    Sexual activity: Not on file   Other Topics Concern    Not on file   Social History Narrative    Not on file     Social Determinants of Health     Financial Resource Strain: Not on file   Food Insecurity: Not on file   Transportation Needs: Not on file   Physical Activity: Not on file   Stress: Not on file   Social Connections: Not on file   Intimate Partner Violence: Not on file   Housing Stability: Not on file  "      Allergies:  No Known Allergies    Medications: reviewed on epic.   Outpatient Medications Marked as Taking for the 12/2/22 encounter (Office Visit) with Cristina Raymond M.D.   Medication Sig Dispense Refill    traMADol (ULTRAM) 50 MG Tab Take 50 mg by mouth every four hours as needed.      therapeutic multivitamin-minerals (THERAGRAN-M) Tab Take 1 Tablet by mouth every day.      tizanidine (ZANAFLEX) 2 MG tablet Take 0.5-1 Tablets by mouth at bedtime as needed (muscle spasms). 30 Tablet 2    atenolol (TENORMIN) 100 MG Tab Take 100 mg by mouth every day.      chlorthalidone (HYGROTON) 25 MG Tab Take 25 mg by mouth every day.      methocarbamol (ROBAXIN) 750 MG Tab Take 1 Tablet by mouth 4 times a day. 120 Tablet 1        Current Outpatient Medications on File Prior to Visit   Medication Sig Dispense Refill    traMADol (ULTRAM) 50 MG Tab Take 50 mg by mouth every four hours as needed.      therapeutic multivitamin-minerals (THERAGRAN-M) Tab Take 1 Tablet by mouth every day.      tizanidine (ZANAFLEX) 2 MG tablet Take 0.5-1 Tablets by mouth at bedtime as needed (muscle spasms). 30 Tablet 2    atenolol (TENORMIN) 100 MG Tab Take 100 mg by mouth every day.      chlorthalidone (HYGROTON) 25 MG Tab Take 25 mg by mouth every day.      methocarbamol (ROBAXIN) 750 MG Tab Take 1 Tablet by mouth 4 times a day. 120 Tablet 1     No current facility-administered medications on file prior to visit.         EXAMINATION     Physical Exam:   /86 (BP Location: Right arm, Patient Position: Sitting, BP Cuff Size: Adult)   Pulse (!) 57   Temp 36.5 °C (97.7 °F) (Temporal)   Ht 1.651 m (5' 5\")   Wt 119 kg (261 lb 11 oz)   SpO2 98%     Constitutional:   Body Habitus: Body mass index is 43.55 kg/m².  Cooperation: Fully cooperates with exam  Appearance: Well-groomed, well-nourished.    Eyes: No scleral icterus to suggest severe liver disease, no proptosis to suggest severe hyperthyroidism    ENT -no obvious auditory deficits, " no noticeable facial droop     Skin -no rashes or lesions noted     Respiratory-  breathing comfortably on room air, no audible wheezing    Cardiovascular-distal extremities warm and well perfused.  No lower extremity edema is noted.     Gastrointestinal - no obvious abdominal masses, non-distended    Psychiatric- alert and oriented ×3. Normal affect.     Gait - normal gait, no use of ambulatory device, nonantalgic.       Musculoskeletal and Neuro -      Cervical spine   Inspection: No deformities of the skin over the cervical spine. No rashes or lesions.     No signs of muscular atrophy in bilateral upper extremities      Tenderness to palpation at  over C7 spinous process , bilateral paracervical muscles, bilateral upper trapezius.      Key points for the international standards for neurological classification of spinal cord injury (ISNCSCI) to light touch.      Dermatome R L   C4 2 2   C5 2 2   C6 1 1   C7 2 2   C8 2 2   T1 2 2   T2 2 2      At least antigravity strength in bilateral UE     Previous exam  Tandem gait and heel walking and toe walking intact.    limited active range of motion in all directions especially sidebending to the left and left cervical extension     Spurling's sign  positive on left, negative on right  Cervical facet loading maneuver  negative bilaterally       Motor Exam Upper Extremities   ? Myotome R L   Shoulder abduction C5 5 5   Elbow flexion C5 5 5   Wrist extension C6 5 5   Elbow extension C7 5 5   Finger flexion C8 5 5   Finger abduction T1 5 5      Reflexes  ?   R L   Biceps   2+ 2+   Brachioradialis   2+ 2+      Armas's sign negative bilaterally      Bilateral hands:   Inspection: No swelling, deformities, or rashes. Symmetric appearing thenar and hyperthenar regions bilaterally.     Special tests:  Tinel's at the wrist over the median nerve negative bilaterally  Carpal tunnel compression: negative bilaterally  Phalen's test: negative bilaterally  Tinel's at the cubital  tunnel: negative bilaterally     RIGHT SHOULDER EXAM  Inspection: no evidence of muscular atrophy or scapular winging. No evidence of erythema, effusion, or bruising. Humerus is not grossly high-riding when comparing the right and left sides  Palpation: tenderness to palpation at supraspinatus, otherwise no tenderness to palpation at anterior glenohumeral joint, posterior glenohumeral joint, greater tuberosity, lesser tuberosity, acromioclavicular joint, and bicipital groove.      Active ROM: Full with flexion, abduction, internal rotation, external rotation     Provocative testing:    RIGHT   Neer -   Monique +   Drop arm -   Empty can +   Resisted external rotation -   Resisted internal rotation -   Lift off test -   Speeds -   Yergasons -   Crossed body adduction -   O'Briens -          MEDICAL DECISION MAKING    Medical records review: see under HPI section.     DATA    Labs: No new labs available for review since last visit.   Lab Results   Component Value Date/Time    SODIUM 137 07/11/2022 10:54 AM    POTASSIUM 4.2 07/11/2022 10:54 AM    CHLORIDE 100 07/11/2022 10:54 AM    CO2 26 07/11/2022 10:54 AM    ANION 11.0 07/11/2022 10:54 AM    GLUCOSE 101 (H) 07/11/2022 10:54 AM    BUN 16 07/11/2022 10:54 AM    CREATININE 0.71 07/11/2022 10:54 AM    CALCIUM 9.2 07/11/2022 10:54 AM    ASTSGOT 16 07/11/2022 10:54 AM    ALTSGPT 18 07/11/2022 10:54 AM    TBILIRUBIN 0.3 07/11/2022 10:54 AM    ALBUMIN 4.2 07/11/2022 10:54 AM    TOTPROTEIN 7.4 07/11/2022 10:54 AM    GLOBULIN 3.2 07/11/2022 10:54 AM    AGRATIO 1.3 07/11/2022 10:54 AM       No results found for: PROTHROMBTM, INR     Lab Results   Component Value Date/Time    WBC 7.9 07/11/2022 10:54 AM    RBC 5.00 07/11/2022 10:54 AM    HEMOGLOBIN 14.7 07/11/2022 10:54 AM    HEMATOCRIT 45.2 07/11/2022 10:54 AM    MCV 90.4 07/11/2022 10:54 AM    MCH 29.4 07/11/2022 10:54 AM    MCHC 32.5 (L) 07/11/2022 10:54 AM    MPV 10.2 07/11/2022 10:54 AM    NEUTSPOLYS 59.20 07/11/2022 10:54  AM    LYMPHOCYTES 29.30 07/11/2022 10:54 AM    MONOCYTES 7.90 07/11/2022 10:54 AM    EOSINOPHILS 2.70 07/11/2022 10:54 AM    BASOPHILS 0.60 07/11/2022 10:54 AM        Lab Results   Component Value Date/Time    HBA1C 5.9 (H) 07/11/2022 10:54 AM        Imaging:   I personally reviewed following images, these are my reads  MRI cervical spine 3/19/2008  C7 burst fracture with slight cord abutment.  Small central disc protrusion at C5-6 with slight cord abutment. Moderate bilateral neuroforaminal stenosis at C5-6. See formal radiology report for further details.     X-ray cervical spine 7/11/2022  Mild anterior listhesis of C2 on C3, C3 on C4, and C4 on C5.  Multilevel facet arthropathy. See formal radiology report for further details.     MRI cervical spine 11/19/22   Chronic burst fx of C7, improved in height compared to previous MRI 3/19/2008, with some posterior retropulsion with some effacement of ventral subarachnoid space. Disc bulge most notable at  C5-6 with moderate bilateral neuroforaminal stenosis at this level. See formal radiology report for further details.      IMAGING radiology reads. I reviewed the following radiology reads   MRI cervical spine 11/19/22  FINDINGS:  There is chronic fracture at C7 vertebral body with posterior retropulsion. There is an approximately 30% loss of its height. There are degenerative changes as evidenced by a reduced intervertebral disc height, loss of disc T2 signal and marginal   osteophytes. The craniovertebral junction is well aligned.     At the level of C2-3,there is no central canal stenosis. There is mild bilateral neural foraminal stenosis.     At the level of C3-4,there is minimal disc bulge. Bilateral uncinate joint arthropathy is seen. There is mild central canal stenosis.     At the level of C4-5,there is no central stenosis. There is mild bilateral neural foraminal stenosis.     At the level of C5-6,there is disc degeneration. There is diffuse disc bulge.  Prominent ligamentum flavum is seen. There is mild central canal and moderate bilateral neural foraminal stenosis.     At the level of C6-7,there is no central canal stenosis.     At the level of C7, there is posterior retropulsion causing effacement of the ventral subarachnoid space. There is mild central canal stenosis.     At the level of C7-T1, there is no spinal or neural foraminal stenosis.     The visualized posterior fossa structures appear normal within limits.  The cervical spinal cord does not demonstrate any mass or abnormal T2 signal intensity.     The visualized pre-and paraspinal soft tissues appear normal within limits.     There is T1 hyperintensity in the interpeduncular cistern likely representing an incidental lipoma.     IMPRESSION:     1.  There is chronic burst fracture of C7 vertebral body. There is an approximately 30% loss of its height. When compared with the previous MRI dated 3/19/2008, the height of the vertebral body is improved. There is posterior retropulsion at this level   causing mild effacement of the ventral subarachnoid space and mild central canal stenosis.  2.  Degenerative disease as described above.  3.  A small incidental lipoma in the interpeduncular cistern.    Results for orders placed during the hospital encounter of 03/19/08     MR-CERVICAL SPINE-W/O     Impression  IMPRESSION:     1. CHRONIC MILD TO MODERATE C7 BURST FRACTURE WITH RETROPULSION OF THE  POSTERIOR SUPERIOR ASPECT OF THE C7 VERTEBRAL BODY WHICH ABUTS THE  VENTRAL ASPECT OF THE CORD WITHOUT SIGNIFICANT INTERVAL CHANGE SINCE  PREVIOUS EXAM EXCEPT FOR DECREASED BONE MARROW EDEMA ON THE CURRENT EXAM.     2. SMALL CENTRAL DISC PROTRUSION AT THE C5-6 LEVEL WHICH ABUTS THE  VENTRAL SURFACE OF THE CORD WITHOUT SIGNIFICANT INTERVAL CHANGE SINCE THE  PREVIOUS EXAM.        AJB/llw                                                  Results for orders placed during the hospital encounter of 07/11/22     DX-CERVICAL  SPINE-2 OR 3 VIEWS     Impression  1.  Multilevel degenerative disc disease and facet degeneration.     2.  Mild multilevel degenerative subluxation.     3.  Limited visualization of the patient's known chronic C7 fracture due to obscuration by overlapping soft tissues on lateral view.                                                             Results for orders placed during the hospital encounter of 22    MR-CERVICAL SPINE-W/O    Impression  1.  There is chronic burst fracture of C7 vertebral body. There is an approximately 30% loss of its height. When compared with the previous MRI dated 3/19/2008, the height of the vertebral body is improved. There is posterior retropulsion at this level  causing mild effacement of the ventral subarachnoid space and mild central canal stenosis.  2.  Degenerative disease as described above.  3.  A small incidental lipoma in the interpeduncular cistern.                                             Diagnosis  Visit Diagnoses     ICD-10-CM   1. Myalgia  M79.10         ASSESSMENT AND PLAN:  Kasia Casanova (: 1970) is a female with history of C7 burst fracture approximately 15 years ago who presents with worsening neck pain and numbness and tingling in bilateral index fingers with a positive left Spurling's likely 2/2 nerve impingement at C5-6 seen on cervical MRI 22.    Also with significant myofascial and myalgia pain at her bilateral paracervical muscles and upper trapezius.     Kasia was seen today for follow-up.    Diagnoses and all orders for this visit:    Myalgia  -     Consent for all Surgical, Special Diagnostic or Therapeutic Procedures        PLAN  Physical Therapy: I have discussed my recommendation for physical therapy referral, patient declined stating she would rather not go due to logistical reasons of PT being relatively unavailable near her in [place]     Diagnostic workup: Personally reviewed at today's visit:  MRI cervical spine  11/19/22     Medications:   - discussed that she should continue tizanidine  -Discussed that at this time my office is not accepting new referrals for chronic narcotic management.  It seems that tramadol 50 mg daily-3 times daily as needed as prescribed by her PCP who just retired has enabled her to perform ADLs and she has been on this medication for 15 years.  States that she has about 30 tablets left and she does not have an appointment with her new PCP yet.  Discussed that I will message her PCP with a summary of today's visit     Interventions:   -Trigger point injections under ultrasound guidance today. The risks, benefits, and alternatives to this procedure were discussed and the patient wishes to proceed with the procedure. Risks include but are not limited to damage to surrounding structures, infection, bleeding, worsening of pain which can be permanent, and collapsed lung. Benefits include pain relief and improved function. Alternatives include not doing the procedure.  - discussed that if she develops weakness in her hand or pain that seems to correspond with impinged nerve at bilateral C6, could consider cervical epidural steroid injection. Her symptoms are tolerable at this time and she would like to defer this injection     Other   - I have discussed that we could consider EMG if MRI unrevealing for a potential cause of numbness/tingling in her fingers (R>L)      Follow-up: 1 month over video    Orders Placed This Encounter    Consent for all Surgical, Special Diagnostic or Therapeutic Procedures       Cristina Raymond MD  Interventional Pain and Spine  Physical Medicine and Rehabilitation  [facility]      The above note documents my personal evaluation of this patient. In addition, I have reviewed and confirmed with the patient and MA the supportive information documented in today's Patient Health Questionnaire and Office Note.     Please note that this dictation was created using voice  recognition software. I have made every reasonable attempt to correct obvious errors, but I expect that there are errors of grammar and possibly content that I did not discover before finalizing the note.

## 2022-12-03 NOTE — PROCEDURES
Patient Name: Kasia Casanova  : 1970  Date of Service: 2022    Physician/s: Cristina Raymond MD    Pre-operative Diagnosis: Myalgia (M79.1)    Post-operative Diagnosis: Myalgia (M79.1)    Procedure: trigger point injections of the following muscles:    Site R L   Splenius capitis     Semispinalis capitis     Splenius cervicis     Sternocleidomastoid     Rhomboids  x   Levator scapulae x x   Pectoralis minor     Pectoralis major     Serratus anterior     Supraspinatus     Infraspinatus     Teres minor     Teres major     Quadratus lumborum     Paravertebral, cervical     Paravertebral, thoracic     Paravertebral, lumbar     Gluteus terrance     Gluteus medius     Gluteus minimus     Tensor fascia lewis     Vastus lateralis     Adductor dev     Adductor longus     Occipitalis     Cervical paraspinal     Trapezius, upper x x   Trapezius, mid     Trapezius, lower     Latissimus dorsi       Description of procedure:    The risks, benefits, and alternatives of the procedure were reviewed and discussed with the patient.  Written informed consent was freely obtained. A pre-procedural time-out was conducted by the physician verifying patient’s identity, procedure to be performed, procedure site and side, and allergy verification. Appropriate equipment was determined to be in place for the procedure.     In the office suite exam room the patient was placed in a seated position and the skin areas for injection over the above muscles were marked. A total of 8 areas of pain were identified for injection. The areas of pain were then prepped and draped in the usual sterile fashion. A solution was prepared with 10 mL of 1% lidocaine. Ultrasound was confirmed to view the adjacent structures for blood vessels and nerves and to confirm the needle path was not within the structures nor was it too deep to be within the lung. A 27g needle was placed into each of the markings at the areas above under ultrasound guidance  with an out of plane approach.. After negative aspiration, approximately 1-1.5 mL of the above solution was injected. The needle was removed intact after each trigger point injection, and the patient's back was covered with a 4x4 gauze, the area was cleansed with sterile normal saline, and a dressing was applied. There were no complications noted. The images were uploaded to our media tab for permanent storage.    Cristina Raymond MD  Interventional Pain and Spine  Physical Medicine and Rehabilitation  [facility]

## 2022-12-05 RX ORDER — TRAMADOL HYDROCHLORIDE 50 MG/1
TABLET ORAL
Qty: 90 TABLET | OUTPATIENT
Start: 2022-12-05

## 2022-12-05 NOTE — TELEPHONE ENCOUNTER
Received request via: Pharmacy  9/9/2022lov  Was the patient seen in the last year in this department? Yes    Does the patient have an active prescription (recently filled or refills available) for medication(s) requested? No    Does the patient have group home Plus and need 100 day supply (blood pressure, diabetes and cholesterol meds only)? Patient does not have SCP

## 2022-12-12 SDOH — ECONOMIC STABILITY: TRANSPORTATION INSECURITY
IN THE PAST 12 MONTHS, HAS THE LACK OF TRANSPORTATION KEPT YOU FROM MEDICAL APPOINTMENTS OR FROM GETTING MEDICATIONS?: NO

## 2022-12-12 SDOH — ECONOMIC STABILITY: INCOME INSECURITY: IN THE LAST 12 MONTHS, WAS THERE A TIME WHEN YOU WERE NOT ABLE TO PAY THE MORTGAGE OR RENT ON TIME?: NO

## 2022-12-12 SDOH — ECONOMIC STABILITY: HOUSING INSECURITY
IN THE LAST 12 MONTHS, WAS THERE A TIME WHEN YOU DID NOT HAVE A STEADY PLACE TO SLEEP OR SLEPT IN A SHELTER (INCLUDING NOW)?: NO

## 2022-12-12 SDOH — HEALTH STABILITY: MENTAL HEALTH
STRESS IS WHEN SOMEONE FEELS TENSE, NERVOUS, ANXIOUS, OR CAN'T SLEEP AT NIGHT BECAUSE THEIR MIND IS TROUBLED. HOW STRESSED ARE YOU?: ONLY A LITTLE

## 2022-12-12 SDOH — ECONOMIC STABILITY: INCOME INSECURITY: HOW HARD IS IT FOR YOU TO PAY FOR THE VERY BASICS LIKE FOOD, HOUSING, MEDICAL CARE, AND HEATING?: NOT VERY HARD

## 2022-12-12 SDOH — ECONOMIC STABILITY: FOOD INSECURITY: WITHIN THE PAST 12 MONTHS, YOU WORRIED THAT YOUR FOOD WOULD RUN OUT BEFORE YOU GOT MONEY TO BUY MORE.: NEVER TRUE

## 2022-12-12 SDOH — ECONOMIC STABILITY: HOUSING INSECURITY: IN THE LAST 12 MONTHS, HOW MANY PLACES HAVE YOU LIVED?: 1

## 2022-12-12 SDOH — HEALTH STABILITY: PHYSICAL HEALTH: ON AVERAGE, HOW MANY DAYS PER WEEK DO YOU ENGAGE IN MODERATE TO STRENUOUS EXERCISE (LIKE A BRISK WALK)?: 1 DAY

## 2022-12-12 SDOH — ECONOMIC STABILITY: FOOD INSECURITY: WITHIN THE PAST 12 MONTHS, THE FOOD YOU BOUGHT JUST DIDN'T LAST AND YOU DIDN'T HAVE MONEY TO GET MORE.: NEVER TRUE

## 2022-12-12 SDOH — ECONOMIC STABILITY: TRANSPORTATION INSECURITY
IN THE PAST 12 MONTHS, HAS LACK OF TRANSPORTATION KEPT YOU FROM MEETINGS, WORK, OR FROM GETTING THINGS NEEDED FOR DAILY LIVING?: NO

## 2022-12-12 SDOH — HEALTH STABILITY: PHYSICAL HEALTH: ON AVERAGE, HOW MANY MINUTES DO YOU ENGAGE IN EXERCISE AT THIS LEVEL?: 10 MIN

## 2022-12-12 SDOH — ECONOMIC STABILITY: TRANSPORTATION INSECURITY
IN THE PAST 12 MONTHS, HAS LACK OF RELIABLE TRANSPORTATION KEPT YOU FROM MEDICAL APPOINTMENTS, MEETINGS, WORK OR FROM GETTING THINGS NEEDED FOR DAILY LIVING?: NO

## 2022-12-12 ASSESSMENT — LIFESTYLE VARIABLES
HOW OFTEN DO YOU HAVE SIX OR MORE DRINKS ON ONE OCCASION: NEVER
AUDIT-C TOTAL SCORE: 1
HOW OFTEN DO YOU HAVE A DRINK CONTAINING ALCOHOL: MONTHLY OR LESS
SKIP TO QUESTIONS 9-10: 1
HOW MANY STANDARD DRINKS CONTAINING ALCOHOL DO YOU HAVE ON A TYPICAL DAY: 1 OR 2

## 2022-12-12 ASSESSMENT — SOCIAL DETERMINANTS OF HEALTH (SDOH)
DO YOU BELONG TO ANY CLUBS OR ORGANIZATIONS SUCH AS CHURCH GROUPS UNIONS, FRATERNAL OR ATHLETIC GROUPS, OR SCHOOL GROUPS?: YES
HOW OFTEN DO YOU GET TOGETHER WITH FRIENDS OR RELATIVES?: TWICE A WEEK
IN A TYPICAL WEEK, HOW MANY TIMES DO YOU TALK ON THE PHONE WITH FAMILY, FRIENDS, OR NEIGHBORS?: MORE THAN THREE TIMES A WEEK
HOW HARD IS IT FOR YOU TO PAY FOR THE VERY BASICS LIKE FOOD, HOUSING, MEDICAL CARE, AND HEATING?: NOT VERY HARD
HOW OFTEN DO YOU GET TOGETHER WITH FRIENDS OR RELATIVES?: TWICE A WEEK
HOW MANY DRINKS CONTAINING ALCOHOL DO YOU HAVE ON A TYPICAL DAY WHEN YOU ARE DRINKING: 1 OR 2
DO YOU BELONG TO ANY CLUBS OR ORGANIZATIONS SUCH AS CHURCH GROUPS UNIONS, FRATERNAL OR ATHLETIC GROUPS, OR SCHOOL GROUPS?: YES
WITHIN THE PAST 12 MONTHS, YOU WORRIED THAT YOUR FOOD WOULD RUN OUT BEFORE YOU GOT THE MONEY TO BUY MORE: NEVER TRUE
HOW OFTEN DO YOU HAVE SIX OR MORE DRINKS ON ONE OCCASION: NEVER
HOW OFTEN DO YOU HAVE A DRINK CONTAINING ALCOHOL: MONTHLY OR LESS
HOW OFTEN DO YOU ATTENT MEETINGS OF THE CLUB OR ORGANIZATION YOU BELONG TO?: 1 TO 4 TIMES PER YEAR
HOW OFTEN DO YOU ATTEND CHURCH OR RELIGIOUS SERVICES?: NEVER
HOW OFTEN DO YOU ATTEND CHURCH OR RELIGIOUS SERVICES?: NEVER
IN A TYPICAL WEEK, HOW MANY TIMES DO YOU TALK ON THE PHONE WITH FAMILY, FRIENDS, OR NEIGHBORS?: MORE THAN THREE TIMES A WEEK
HOW OFTEN DO YOU ATTENT MEETINGS OF THE CLUB OR ORGANIZATION YOU BELONG TO?: 1 TO 4 TIMES PER YEAR

## 2022-12-14 ENCOUNTER — OFFICE VISIT (OUTPATIENT)
Dept: MEDICAL GROUP | Facility: LAB | Age: 52
End: 2022-12-14
Payer: COMMERCIAL

## 2022-12-14 VITALS
BODY MASS INDEX: 43.68 KG/M2 | OXYGEN SATURATION: 94 % | RESPIRATION RATE: 14 BRPM | HEIGHT: 65 IN | SYSTOLIC BLOOD PRESSURE: 126 MMHG | DIASTOLIC BLOOD PRESSURE: 68 MMHG | WEIGHT: 262.2 LBS | TEMPERATURE: 98.1 F | HEART RATE: 70 BPM

## 2022-12-14 DIAGNOSIS — G89.29 CHRONIC NECK PAIN: ICD-10-CM

## 2022-12-14 DIAGNOSIS — I10 PRIMARY HYPERTENSION: ICD-10-CM

## 2022-12-14 DIAGNOSIS — R73.03 PREDIABETES: ICD-10-CM

## 2022-12-14 DIAGNOSIS — M54.2 CHRONIC NECK PAIN: ICD-10-CM

## 2022-12-14 DIAGNOSIS — E78.00 ELEVATED LDL CHOLESTEROL LEVEL: ICD-10-CM

## 2022-12-14 PROCEDURE — 99214 OFFICE O/P EST MOD 30 MIN: CPT | Performed by: NURSE PRACTITIONER

## 2022-12-14 RX ORDER — TRAMADOL HYDROCHLORIDE 50 MG/1
50 TABLET ORAL EVERY 8 HOURS PRN
Qty: 90 TABLET | Refills: 0 | Status: SHIPPED | OUTPATIENT
Start: 2023-12-14 | End: 2023-03-20 | Stop reason: SDUPTHER

## 2022-12-14 RX ORDER — TRAMADOL HYDROCHLORIDE 50 MG/1
50 TABLET ORAL EVERY 8 HOURS PRN
Qty: 90 TABLET | Refills: 0 | Status: SHIPPED | OUTPATIENT
Start: 2023-02-12 | End: 2023-03-14

## 2022-12-14 RX ORDER — TRAMADOL HYDROCHLORIDE 50 MG/1
50 TABLET ORAL EVERY 8 HOURS PRN
Qty: 90 TABLET | Refills: 0 | Status: SHIPPED | OUTPATIENT
Start: 2022-12-14 | End: 2023-01-13

## 2022-12-14 ASSESSMENT — FIBROSIS 4 INDEX: FIB4 SCORE: 0.85

## 2022-12-14 NOTE — ASSESSMENT & PLAN NOTE
Chronic condition, new to me.  Last A1c was 5.9.  Currently working on lifestyle modifications including diet and exercise. Denies any unexplained weight loss, polyuria, polydipsia.

## 2022-12-14 NOTE — PROGRESS NOTES
Subjective:     CC:    Chief Complaint   Patient presents with    Medication Refill    Letter for School/Work     Covid clearance      HISTORY OF THE PRESENT ILLNESS: Patient is a 52 y.o. female. This pleasant patient is here today to establish care and discuss the following:    Chronic neck pain  This is chronic condition, new to me. Patient is followed by physiatry.  Recently had trigger point injections, has felt some relief.  Initially injury in , after diving into the pool from a trampoline. She remembers a surgeon at that time told her that her injury was nonoperable and she has managed this conservatively.  Currently taking tramadol 50 mg TID with relief.     She  reports current alcohol use.  She  reports no history of drug use.    Any major change in health since last appointment? No    Consequences of Chronic Opiate therapy:  (5 A's)  Analgesia: Compared to no treatment or prior treatment, pain is currently not changed  Activity: not changed  Adverse Events: She denies constipation, dry mouth, itchy skin, nausea and sedation  Aberrant Behaviors: She reports she is taking medication as prescribed and is not veering from agreed treatment regimen or provider recommendations. There have been no inappropriate refills or lost/stolen meds reported.  Affect/Mood: Pain is impacting patient's mood.  Patient denies depression/anxiety.    Nonnarcotic treatments that are being used: Tylenol, NSAIDs/PATEL-2 and muscle relaxers.     Last imagin2022    Opioid Risk Score: 0    Interpretation of Opioid Risk Score   Score 0-3 = Low risk of abuse. Do UDS at least once per year.  Score 4-7 = Moderate risk of abuse. Do UDS 1-4 times per year.  Score 8+ = High risk of abuse. Refer to specialist.    Last order of CONTROLLED SUBSTANCE TREATMENT AGREEMENT was found on 2022 from Office Visit on 2022     UDS Summary       This patient has no relevant Health Maintenance data.          I have reviewed the medical  "records, the Prescription Monitoring Program and I have determined that controlled substance treatment is medically indicated.     Elevated LDL cholesterol level  This is a chronic condition, new to me.  Patient currently managing with lifestyle modifications. The 10-year ASCVD risk score (Edmond HOOKER, et al., 2019) is: 2.2%.  Denies chest pain, shortness of breath, heart palpitations.    Component      Latest Ref Rng 7/11/2022  10:54 AM   Cholesterol,Tot      100 - 199 mg/dL 197    Triglycerides      0 - 149 mg/dL 117    HDL      >=40 mg/dL 57    LDL      <100 mg/dL 117 (H)       (H) High      Prediabetes  Chronic condition, new to me.  Last A1c was 5.9.  Currently working on lifestyle modifications including diet and exercise. Denies any unexplained weight loss, polyuria, polydipsia.     Primary hypertension  Stable. Currently taking atenolol and chlorthalidone as directed.   She is not taking baby aspirin daily.   She is monitoring BP at home.   Denies symptoms low BP: light-headed, tunnel-vision, unusual fatigue.   Denies symptoms high BP:pounding headache, visual changes, palpitations, flushed face.   Denies medicine side effects: unusual fatigue, slow heartbeat, foot/leg swelling, cough.    ROS:   As documented in history of present illness above      Objective:     Exam: /68 (BP Location: Right arm, Patient Position: Sitting, BP Cuff Size: Large adult long)   Pulse 70   Temp 36.7 °C (98.1 °F)   Resp 14   Ht 1.651 m (5' 5\")   Wt 119 kg (262 lb 3.2 oz)   SpO2 94%  Body mass index is 43.63 kg/m².    Constitutional: Alert, no distress, well-groomed.  Skin: Warm, dry, good turgor, no rashes in visible areas.  Eye: Equal, round and reactive, conjunctiva clear, lids normal.  ENMT: Lips without lesions, good dentition, moist mucous membranes.  Neck: Trachea midline, no masses, no thyromegaly.  Respiratory: Unlabored respiratory effort, no cough.  MSK: Normal gait, moves all extremities.  Neuro: Grossly " non-focal.   Psych: Alert and oriented x3, normal affect and mood.    Assessment & Plan:   52 y.o. female with the following -    1. Chronic neck pain  Controlled substance discussed with client. Client agrees to abide by controlled substance contract; signed today. UDS obtained today. Discussed that patient is not to drive or operate machinery on this medicine, not to be used during working hours. Side effects of medication prescribed today were discussed with the patient including how to take the medications and proper dosage. Discussed repercussions of not taking the medication as prescribed. Instructed to call the office should she have any negative side effects or problems with the medication.    In prescribing controlled substances to this patient, I certify that I have obtained and reviewed the medical history of Kasia. I have also made a good star effort to obtain applicable records from other providers who have treated the patient and records did not demonstrate any increased risk of substance abuse that would prevent me from prescribing controlled substances.      I have conducted a physical exam and documented it. I have reviewed Kasia’s prescription history as maintained by the [place] Prescription Monitoring Program.      I have assessed the patient’s risk for abuse, dependency, and addiction using the validated Opioid Risk Tool available at https://www.mdcalc.com/nglasj-vtus-eugi-ort-narcotic-abuse.      Given the above, I believe the benefits of controlled substance therapy outweigh the risks. The reasons for prescribing controlled substances include non-narcotic, oral analgesic alternatives have been inadequate for pain control. Accordingly, I have discussed the risk and benefits, treatment plan, and alternative therapies with the patient.     Obtained and reviewed patient utilization report from [facility] pharmacy database on 12/14/2022 prior to writing prescription for controlled substance II, III  or IV per [place] bill . Based on assessment of the report, the prescription is medically necessary.     - [facility] PAIN MANAGEMENT SCREEN; Future  - Controlled Substance Treatment Agreement  - traMADol (ULTRAM) 50 MG Tab; Take 1 Tablet by mouth every 8 hours as needed for Severe Pain for up to 30 days.  Dispense: 90 Tablet; Refill: 0  - traMADol (ULTRAM) 50 MG Tab; Take 1 Tablet by mouth every 8 hours as needed for Severe Pain for up to 30 days.  Dispense: 90 Tablet; Refill: 0  - traMADol (ULTRAM) 50 MG Tab; Take 1 Tablet by mouth every 8 hours as needed for Severe Pain for up to 30 days.  Dispense: 90 Tablet; Refill: 0    2. Elevated LDL cholesterol level  Chronic, stable condition.  Labs as indicated.  Continue and lifestyle modifications.    3. Prediabetes  Recommend to reduce sugar/carbohydrate/alcohol, eat more vegetables and lean meats such as fish/chicken/turkey. Recommend 30 minutes of cardiovascular exercise most days of the week.    4. Primary hypertension  Well-controlled on current regimen.  Labs as indicated.  Continue antihypertensive medications.  Discussed decreasing salt intake.  Emphasized benefits of exercise and diet.    Please note that this dictation was created using voice recognition software. I have made every reasonable attempt to correct obvious errors, but I expect that there are errors of grammar and possibly content that I did not discover before finalizing the note.

## 2022-12-14 NOTE — ASSESSMENT & PLAN NOTE
Stable. Currently taking atenolol and chlorthalidone as directed.   She is not taking baby aspirin daily.   She is monitoring BP at home.   Denies symptoms low BP: light-headed, tunnel-vision, unusual fatigue.   Denies symptoms high BP:pounding headache, visual changes, palpitations, flushed face.   Denies medicine side effects: unusual fatigue, slow heartbeat, foot/leg swelling, cough.

## 2022-12-14 NOTE — ASSESSMENT & PLAN NOTE
This is chronic condition, new to me. Patient is followed by physiatry.  Recently had trigger point injections, has felt some relief.  Initially injury in , after diving into the pool from a trampoline. She remembers a surgeon at that time told her that her injury was nonoperable and she has managed this conservatively.  Currently taking tramadol 50 mg TID with relief.     She  reports current alcohol use.  She  reports no history of drug use.    Any major change in health since last appointment? No    Consequences of Chronic Opiate therapy:  (5 A's)  Analgesia: Compared to no treatment or prior treatment, pain is currently not changed  Activity: not changed  Adverse Events: She denies constipation, dry mouth, itchy skin, nausea and sedation  Aberrant Behaviors: She reports she is taking medication as prescribed and is not veering from agreed treatment regimen or provider recommendations. There have been no inappropriate refills or lost/stolen meds reported.  Affect/Mood: Pain is impacting patient's mood.  Patient denies depression/anxiety.    Nonnarcotic treatments that are being used: Tylenol, NSAIDs/PATEL-2 and muscle relaxers.     Last imagin2022    Opioid Risk Score: 0    Interpretation of Opioid Risk Score   Score 0-3 = Low risk of abuse. Do UDS at least once per year.  Score 4-7 = Moderate risk of abuse. Do UDS 1-4 times per year.  Score 8+ = High risk of abuse. Refer to specialist.    Last order of CONTROLLED SUBSTANCE TREATMENT AGREEMENT was found on 2022 from Office Visit on 2022     UDS Summary     This patient has no relevant Health Maintenance data.        I have reviewed the medical records, the Prescription Monitoring Program and I have determined that controlled substance treatment is medically indicated.

## 2022-12-14 NOTE — ASSESSMENT & PLAN NOTE
This is a chronic condition, new to me.  Patient currently managing with lifestyle modifications. The 10-year ASCVD risk score (Edmond HOOKER, et al., 2019) is: 2.2%.  Denies chest pain, shortness of breath, heart palpitations.    Component      Latest Ref Rng 7/11/2022  10:54 AM   Cholesterol,Tot      100 - 199 mg/dL 197    Triglycerides      0 - 149 mg/dL 117    HDL      >=40 mg/dL 57    LDL      <100 mg/dL 117 (H)       (H) High

## 2022-12-14 NOTE — LETTER
December 14, 2022         Patient: Kasia Casanova   YOB: 1970   Date of Visit: 12/14/2022           To Whom it May Concern:    Kasia Casanova was seen in my clinic on 12/14/2022. She may return to work on 12/19/2022.    If you have any questions or concerns, please don't hesitate to call.        Sincerely,           JASPER Laboy.

## 2022-12-20 RX ORDER — CHLORTHALIDONE 25 MG/1
25 TABLET ORAL DAILY
Qty: 30 TABLET | Refills: 5 | Status: SHIPPED | OUTPATIENT
Start: 2022-12-20 | End: 2023-05-31

## 2022-12-20 RX ORDER — ATENOLOL 100 MG/1
100 TABLET ORAL DAILY
Qty: 30 TABLET | Refills: 5 | Status: SHIPPED | OUTPATIENT
Start: 2022-12-20 | End: 2024-01-29

## 2022-12-20 NOTE — TELEPHONE ENCOUNTER
Received request via: Patient    Was the patient seen in the last year in this department? Yes    Does the patient have an active prescription (recently filled or refills available) for medication(s) requested? No  12/14/22  Does the patient have shelter Plus and need 100 day supply (blood pressure, diabetes and cholesterol meds only)? Patient does not have SCP

## 2023-01-05 ENCOUNTER — TELEMEDICINE (OUTPATIENT)
Dept: PHYSICAL MEDICINE AND REHAB | Facility: MEDICAL CENTER | Age: 53
End: 2023-01-05
Payer: COMMERCIAL

## 2023-01-05 VITALS — HEIGHT: 66 IN | WEIGHT: 262.35 LBS | BODY MASS INDEX: 42.16 KG/M2

## 2023-01-05 DIAGNOSIS — R20.2 NUMBNESS AND TINGLING IN BOTH HANDS: ICD-10-CM

## 2023-01-05 DIAGNOSIS — M75.41 IMPINGEMENT SYNDROME OF RIGHT SHOULDER: ICD-10-CM

## 2023-01-05 DIAGNOSIS — M79.10 MYALGIA: ICD-10-CM

## 2023-01-05 DIAGNOSIS — Z87.81 H/O CERVICAL FRACTURE: ICD-10-CM

## 2023-01-05 DIAGNOSIS — R20.0 NUMBNESS AND TINGLING IN BOTH HANDS: ICD-10-CM

## 2023-01-05 DIAGNOSIS — M54.2 CERVICALGIA: ICD-10-CM

## 2023-01-05 PROCEDURE — 99213 OFFICE O/P EST LOW 20 MIN: CPT | Mod: 95 | Performed by: STUDENT IN AN ORGANIZED HEALTH CARE EDUCATION/TRAINING PROGRAM

## 2023-01-05 ASSESSMENT — PAIN SCALES - GENERAL: PAINLEVEL: 3=SLIGHT PAIN

## 2023-01-05 ASSESSMENT — FIBROSIS 4 INDEX: FIB4 SCORE: 0.85

## 2023-01-05 ASSESSMENT — PATIENT HEALTH QUESTIONNAIRE - PHQ9: CLINICAL INTERPRETATION OF PHQ2 SCORE: 0
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
## 2023-02-13 RX ORDER — TIZANIDINE 2 MG/1
1-2 TABLET ORAL NIGHTLY PRN
Qty: 30 TABLET | Refills: 0 | Status: SHIPPED | OUTPATIENT
Start: 2023-02-13 | End: 2023-02-17 | Stop reason: SDUPTHER

## 2023-02-13 NOTE — TELEPHONE ENCOUNTER
tizanidine (ZANAFLEX) 2 MG tablet     Received request via: Patient    Was the patient seen in the last year in this department? Yes    Does the patient have an active prescription (recently filled or refills available) for medication(s) requested?  yes    Does the patient have [facility] and need 100 day supply (blood pressure, diabetes and cholesterol meds only)? Patient does not have SCP    Patient comment: Is it possible to get a 3 month supply for insurance purposes?

## 2023-02-17 RX ORDER — TIZANIDINE 2 MG/1
1-2 TABLET ORAL NIGHTLY PRN
Qty: 90 TABLET | Refills: 2 | Status: SHIPPED | OUTPATIENT
Start: 2023-02-17 | End: 2023-12-12 | Stop reason: SDUPTHER

## 2023-03-20 ENCOUNTER — TELEMEDICINE (OUTPATIENT)
Dept: MEDICAL GROUP | Facility: LAB | Age: 53
End: 2023-03-20
Payer: COMMERCIAL

## 2023-03-20 DIAGNOSIS — G89.29 CHRONIC NECK PAIN: ICD-10-CM

## 2023-03-20 DIAGNOSIS — Z12.83 SKIN CANCER SCREENING: ICD-10-CM

## 2023-03-20 DIAGNOSIS — M54.2 CHRONIC NECK PAIN: ICD-10-CM

## 2023-03-20 PROCEDURE — 99213 OFFICE O/P EST LOW 20 MIN: CPT | Mod: 95 | Performed by: NURSE PRACTITIONER

## 2023-03-20 RX ORDER — TRAMADOL HYDROCHLORIDE 50 MG/1
50 TABLET ORAL EVERY 8 HOURS PRN
Qty: 30 TABLET | Refills: 0 | Status: SHIPPED | OUTPATIENT
Start: 2023-05-19 | End: 2023-03-20 | Stop reason: SDUPTHER

## 2023-03-20 RX ORDER — TRAMADOL HYDROCHLORIDE 50 MG/1
50 TABLET ORAL EVERY 8 HOURS PRN
Qty: 90 TABLET | Refills: 0 | Status: SHIPPED | OUTPATIENT
Start: 2023-04-19 | End: 2023-05-19

## 2023-03-20 RX ORDER — TRAMADOL HYDROCHLORIDE 50 MG/1
50 TABLET ORAL EVERY 8 HOURS PRN
Qty: 90 TABLET | Refills: 0 | Status: SHIPPED | OUTPATIENT
Start: 2023-03-20 | End: 2023-09-05 | Stop reason: SDUPTHER

## 2023-03-20 RX ORDER — TRAMADOL HYDROCHLORIDE 50 MG/1
50 TABLET ORAL EVERY 8 HOURS PRN
Qty: 90 TABLET | Refills: 0 | Status: SHIPPED | OUTPATIENT
Start: 2023-05-19 | End: 2023-06-18

## 2023-03-20 NOTE — PROGRESS NOTES
Virtual Visit: Established Patient   This visit was conducted via Zoom using secure and encrypted videoconferencing technology.   The patient was in their home in the state [facility].    The patient's identity was confirmed and verbal consent was obtained for this virtual visit.     Subjective:   CC:   Chief Complaint   Patient presents with    Medication Refill     traMADol (ULTRAM) 50 MG Tab    Referral Needed     Dermatologist      Kasia Casanova is a 52 y.o. female presenting for evaluation and management of:    Chronic neck pain  This is chronic condition. Patient is followed by physiatry.  Recently had trigger point injections, has felt some relief.  Initially injury in , after diving into the pool from a trampoline. She remembers a surgeon at that time told her that her injury was nonoperable and she has managed this conservatively.  Currently taking tramadol 50 mg TID with relief.      She  reports current alcohol use.  She  reports no history of drug use.     Any major change in health since last appointment? No     Consequences of Chronic Opiate therapy:  (5 A's)  Analgesia: Compared to no treatment or prior treatment, pain is currently not changed  Activity: not changed  Adverse Events: She denies constipation, dry mouth, itchy skin, nausea and sedation  Aberrant Behaviors: She reports she is taking medication as prescribed and is not veering from agreed treatment regimen or provider recommendations. There have been no inappropriate refills or lost/stolen meds reported.  Affect/Mood: Pain is impacting patient's mood.  Patient denies depression/anxiety.     Nonnarcotic treatments that are being used: Tylenol, NSAIDs/PATEL-2 and muscle relaxers.      Last imagin2022     Opioid Risk Score: 0     Interpretation of Opioid Risk Score   Score 0-3 = Low risk of abuse. Do UDS at least once per year.  Score 4-7 = Moderate risk of abuse. Do UDS 1-4 times per year.  Score 8+ = High risk of abuse. Refer to  specialist.     Last order of CONTROLLED SUBSTANCE TREATMENT AGREEMENT was found on 12/14/2022 from Office Visit on 12/14/2022          UDS Summary      This patient has no relevant Health Maintenance data.          I have reviewed the medical records, the Prescription Monitoring Program and I have determined that controlled substance treatment is medically indicated.     ROS   As documented in history of present illness above    Current medicines (including changes today)  Current Outpatient Medications   Medication Sig Dispense Refill    traMADol (ULTRAM) 50 MG Tab Take 1 Tablet by mouth every 8 hours as needed for Severe Pain for up to 30 days. 90 Tablet 0    [START ON 4/19/2023] traMADol (ULTRAM) 50 MG Tab Take 1 Tablet by mouth every 8 hours as needed for Moderate Pain for up to 30 days. 90 Tablet 0    [START ON 5/19/2023] traMADol (ULTRAM) 50 MG Tab Take 1 Tablet by mouth every 8 hours as needed for Moderate Pain for up to 30 days. 30 Tablet 0    tizanidine (ZANAFLEX) 2 MG tablet Take 0.5-1 Tablets by mouth at bedtime as needed (muscle spasms). 90 Tablet 2    atenolol (TENORMIN) 100 MG Tab Take 1 Tablet by mouth every day. 30 Tablet 5    chlorthalidone (HYGROTON) 25 MG Tab Take 1 Tablet by mouth every day. 30 Tablet 5    therapeutic multivitamin-minerals (THERAGRAN-M) Tab Take 1 Tablet by mouth every day.       No current facility-administered medications for this visit.       Patient Active Problem List    Diagnosis Date Noted    Elevated LDL cholesterol level 07/12/2022    Prediabetes 07/12/2022    Primary hypertension 07/11/2022    Chronic neck pain 07/11/2022        Objective:   There were no vitals taken for this visit.    Physical Exam:  Constitutional: Alert, no distress, well-groomed.  Skin: No rashes in visible areas.  Eye: Round. Conjunctiva clear, lids normal. No icterus.   ENMT: Lips pink without lesions, good dentition, moist mucous membranes. Phonation normal.  Neck: No masses, no thyromegaly.  Moves freely without pain.  Respiratory: Unlabored respiratory effort, no cough or audible wheeze  Psych: Alert and oriented x3, normal affect and mood.     Assessment and Plan:   The following treatment plan was discussed:     1. Skin cancer screening  Referral placed to dermatology.   - Referral to Dermatology    2. Chronic neck pain  Controlled substance discussed with client. Client agrees to abide by controlled substance contract; signed today. UDS obtained today. Discussed that patient is not to drive or operate machinery on this medicine, not to be used during working hours. Side effects of medication prescribed today were discussed with the patient including how to take the medications and proper dosage. Discussed repercussions of not taking the medication as prescribed. Instructed to call the office should she have any negative side effects or problems with the medication.    In prescribing controlled substances to this patient, I certify that I have obtained and reviewed the medical history of Kasia. I have also made a good star effort to obtain applicable records from other providers who have treated the patient and records did not demonstrate any increased risk of substance abuse that would prevent me from prescribing controlled substances.      I have conducted a physical exam and documented it. I have reviewed Kasia’s prescription history as maintained by the [place] Prescription Monitoring Program.      I have assessed the patient’s risk for abuse, dependency, and addiction using the validated Opioid Risk Tool available at https://www.mdcalc.com/orpwnq-rswn-euyq-ort-narcotic-abuse.      Given the above, I believe the benefits of controlled substance therapy outweigh the risks. The reasons for prescribing controlled substances include non-narcotic, oral analgesic alternatives have been inadequate for pain control. Accordingly, I have discussed the risk and benefits, treatment plan, and alternative  therapies with the patient.     Obtained and reviewed patient utilization report from [facility] pharmacy database on 3/20/2023 prior to writing prescription for controlled substance II, III or IV per [place] bill . Based on assessment of the report, the prescription is medically necessary.     - traMADol (ULTRAM) 50 MG Tab; Take 1 Tablet by mouth every 8 hours as needed for Severe Pain for up to 30 days.  Dispense: 90 Tablet; Refill: 0  - traMADol (ULTRAM) 50 MG Tab; Take 1 Tablet by mouth every 8 hours as needed for Moderate Pain for up to 30 days.  Dispense: 90 Tablet; Refill: 0  - traMADol (ULTRAM) 50 MG Tab; Take 1 Tablet by mouth every 8 hours as needed for Moderate Pain for up to 30 days.  Dispense: 30 Tablet; Refill: 0

## 2023-03-20 NOTE — ASSESSMENT & PLAN NOTE
This is chronic condition. Patient is followed by physiatry.  Recently had trigger point injections, has felt some relief.  Initially injury in , after diving into the pool from a trampoline. She remembers a surgeon at that time told her that her injury was nonoperable and she has managed this conservatively.  Currently taking tramadol 50 mg TID with relief.      She  reports current alcohol use.  She  reports no history of drug use.     Any major change in health since last appointment? No     Consequences of Chronic Opiate therapy:  (5 A's)  Analgesia: Compared to no treatment or prior treatment, pain is currently not changed  Activity: not changed  Adverse Events: She denies constipation, dry mouth, itchy skin, nausea and sedation  Aberrant Behaviors: She reports she is taking medication as prescribed and is not veering from agreed treatment regimen or provider recommendations. There have been no inappropriate refills or lost/stolen meds reported.  Affect/Mood: Pain is impacting patient's mood.  Patient denies depression/anxiety.     Nonnarcotic treatments that are being used: Tylenol, NSAIDs/PATEL-2 and muscle relaxers.      Last imagin2022     Opioid Risk Score: 0     Interpretation of Opioid Risk Score   Score 0-3 = Low risk of abuse. Do UDS at least once per year.  Score 4-7 = Moderate risk of abuse. Do UDS 1-4 times per year.  Score 8+ = High risk of abuse. Refer to specialist.     Last order of CONTROLLED SUBSTANCE TREATMENT AGREEMENT was found on 2022 from Office Visit on 2022          UDS Summary      This patient has no relevant Health Maintenance data.          I have reviewed the medical records, the Prescription Monitoring Program and I have determined that controlled substance treatment is medically indicated.

## 2023-05-31 RX ORDER — CHLORTHALIDONE 25 MG/1
25 TABLET ORAL DAILY
Qty: 90 TABLET | Refills: 3 | Status: SHIPPED | OUTPATIENT
Start: 2023-05-31

## 2023-05-31 NOTE — TELEPHONE ENCOUNTER
Received request via: Pharmacy    Was the patient seen in the last year in this department? Yes  LOV 03/20/2023 - Telemedicine  Does the patient have an active prescription (recently filled or refills available) for medication(s) requested? No    Does the patient have [facility] Plus and need 100 day supply (blood pressure, diabetes and cholesterol meds only)? Medication is not for cholesterol, blood pressure or diabetes and Patient does not have SCP

## 2023-07-10 DIAGNOSIS — H00.019 HORDEOLUM, UNSPECIFIED HORDEOLUM TYPE, UNSPECIFIED LATERALITY: ICD-10-CM

## 2023-07-10 RX ORDER — CIPROFLOXACIN HYDROCHLORIDE 3.5 MG/ML
1 SOLUTION/ DROPS TOPICAL EVERY 4 HOURS
Qty: 10 ML | Refills: 0 | Status: SHIPPED | OUTPATIENT
Start: 2023-07-10

## 2023-07-11 ENCOUNTER — TELEMEDICINE (OUTPATIENT)
Dept: MEDICAL GROUP | Facility: LAB | Age: 53
End: 2023-07-11
Payer: COMMERCIAL

## 2023-07-11 DIAGNOSIS — H00.011 HORDEOLUM EXTERNUM OF RIGHT UPPER EYELID: ICD-10-CM

## 2023-07-11 PROCEDURE — 99213 OFFICE O/P EST LOW 20 MIN: CPT | Mod: 95 | Performed by: NURSE PRACTITIONER

## 2023-07-11 NOTE — PROGRESS NOTES
Virtual Visit: Established Patient   This visit was conducted via Zoom using secure and encrypted videoconferencing technology.   The patient was in their home in the [facility].    The patient's identity was confirmed and verbal consent was obtained for this virtual visit.     Subjective:   CC:   Chief Complaint   Patient presents with    Stye     R eye   Eye lids draining last night        Kasia Casanova is a 52 y.o. female presenting for evaluation and management of:    Stye  Onset 4 days ago. Reports swelling and redness on the top inside right eyelid. Reports that she has been using warm compresses. She did notice that it started to drain yesterday and has seen some improvement in the swelling. She started using antibiotic eye drops yesterday which also seem to help. Denies fever, chills, vision changes. She does note some light sensitivity.     ROS   As documented in history of present illness above    Current medicines (including changes today)  Current Outpatient Medications   Medication Sig Dispense Refill    ciprofloxacin (CILOXIN) 0.3 % Solution Administer 1 Drop into both eyes every 4 hours. 10 mL 0    chlorthalidone (HYGROTON) 25 MG Tab TAKE 1 TABLET BY MOUTH EVERY DAY 90 Tablet 3    tizanidine (ZANAFLEX) 2 MG tablet Take 0.5-1 Tablets by mouth at bedtime as needed (muscle spasms). 90 Tablet 2    atenolol (TENORMIN) 100 MG Tab Take 1 Tablet by mouth every day. 30 Tablet 5    therapeutic multivitamin-minerals (THERAGRAN-M) Tab Take 1 Tablet by mouth every day.       No current facility-administered medications for this visit.       Patient Active Problem List    Diagnosis Date Noted    Elevated LDL cholesterol level 07/12/2022    Prediabetes 07/12/2022    Primary hypertension 07/11/2022    Chronic neck pain 07/11/2022        Objective:   There were no vitals taken for this visit.    Physical Exam:  Constitutional: Alert, no distress, well-groomed.  Skin: No rashes in visible areas.  Eye: Round.  Conjunctiva clear, lids normal. No icterus.   ENMT: Lips pink without lesions, good dentition, moist mucous membranes. Phonation normal.  Neck: No masses, no thyromegaly. Moves freely without pain.  Respiratory: Unlabored respiratory effort, no cough or audible wheeze  Psych: Alert and oriented x3, normal affect and mood.     Assessment and Plan:   The following treatment plan was discussed:     1. Hordeolum externum of right upper eyelid  Discussed management with warm, moist compresses placed on the affected areas frequently (eg, for 5 to 10 minutes three to five times per day) in order to facilitate drainage. Massage and gentle wiping of the affected eyelid after the warm compress can also aid in drainage. Patient should discontinue eye makeup. Continue eye drops as prescribed. If, despite management with warm compresses, the lesion does not reduce in size within one to two weeks, consider referral to ophthalmologist for consideration of incision and drainage.

## 2023-07-12 ENCOUNTER — PATIENT MESSAGE (OUTPATIENT)
Dept: MEDICAL GROUP | Facility: LAB | Age: 53
End: 2023-07-12
Payer: COMMERCIAL

## 2023-07-12 DIAGNOSIS — H44.009 EYE INFECTION, UNSPECIFIED LATERALITY: ICD-10-CM

## 2023-07-12 RX ORDER — AMOXICILLIN 500 MG/1
500 CAPSULE ORAL 2 TIMES DAILY
Qty: 14 CAPSULE | Refills: 0 | Status: SHIPPED | OUTPATIENT
Start: 2023-07-12 | End: 2023-10-09

## 2023-09-05 DIAGNOSIS — G89.29 CHRONIC NECK PAIN: ICD-10-CM

## 2023-09-05 DIAGNOSIS — M54.2 CHRONIC NECK PAIN: ICD-10-CM

## 2023-09-05 RX ORDER — TRAMADOL HYDROCHLORIDE 50 MG/1
50 TABLET ORAL EVERY 8 HOURS PRN
Qty: 90 TABLET | Refills: 0 | Status: SHIPPED | OUTPATIENT
Start: 2023-09-05 | End: 2023-10-05

## 2023-09-05 NOTE — TELEPHONE ENCOUNTER
Received request via: Pharmacy    Was the patient seen in the last year in this department? Yes  LOV : 7/11/2023 - TELEMEDICINE   Does the patient have an active prescription (recently filled or refills available) for medication(s) requested? No    Does the patient have [facility] Plus and need 100 day supply (blood pressure, diabetes and cholesterol meds only)? Patient does not have SCP    UPCOMING APPT 9/25/2023

## 2023-09-25 ENCOUNTER — APPOINTMENT (OUTPATIENT)
Dept: PHYSICAL MEDICINE AND REHAB | Facility: MEDICAL CENTER | Age: 53
End: 2023-09-25
Payer: COMMERCIAL

## 2023-10-09 ENCOUNTER — OFFICE VISIT (OUTPATIENT)
Dept: MEDICAL GROUP | Facility: LAB | Age: 53
End: 2023-10-09
Payer: COMMERCIAL

## 2023-10-09 ENCOUNTER — APPOINTMENT (OUTPATIENT)
Dept: MEDICAL GROUP | Facility: LAB | Age: 53
End: 2023-10-09
Payer: COMMERCIAL

## 2023-10-09 VITALS
BODY MASS INDEX: 41.01 KG/M2 | HEART RATE: 90 BPM | SYSTOLIC BLOOD PRESSURE: 122 MMHG | RESPIRATION RATE: 14 BRPM | WEIGHT: 255.2 LBS | HEIGHT: 66 IN | DIASTOLIC BLOOD PRESSURE: 70 MMHG | OXYGEN SATURATION: 95 % | TEMPERATURE: 98.6 F

## 2023-10-09 DIAGNOSIS — Z12.12 SCREENING FOR COLORECTAL CANCER: ICD-10-CM

## 2023-10-09 DIAGNOSIS — Z12.11 SCREENING FOR COLORECTAL CANCER: ICD-10-CM

## 2023-10-09 DIAGNOSIS — G89.29 CHRONIC NECK PAIN: ICD-10-CM

## 2023-10-09 DIAGNOSIS — I10 PRIMARY HYPERTENSION: ICD-10-CM

## 2023-10-09 DIAGNOSIS — R73.03 PREDIABETES: ICD-10-CM

## 2023-10-09 DIAGNOSIS — M54.2 CHRONIC NECK PAIN: ICD-10-CM

## 2023-10-09 DIAGNOSIS — E78.00 ELEVATED LDL CHOLESTEROL LEVEL: ICD-10-CM

## 2023-10-09 PROCEDURE — 3074F SYST BP LT 130 MM HG: CPT | Performed by: NURSE PRACTITIONER

## 2023-10-09 PROCEDURE — 3078F DIAST BP <80 MM HG: CPT | Performed by: NURSE PRACTITIONER

## 2023-10-09 PROCEDURE — 99214 OFFICE O/P EST MOD 30 MIN: CPT | Performed by: NURSE PRACTITIONER

## 2023-10-09 RX ORDER — TRAMADOL HYDROCHLORIDE 50 MG/1
50 TABLET ORAL EVERY 8 HOURS PRN
Qty: 90 TABLET | Refills: 0 | Status: SHIPPED | OUTPATIENT
Start: 2023-11-08 | End: 2023-12-08

## 2023-10-09 RX ORDER — TRAMADOL HYDROCHLORIDE 50 MG/1
50 TABLET ORAL EVERY 8 HOURS PRN
Qty: 90 TABLET | Refills: 0 | Status: SHIPPED | OUTPATIENT
Start: 2023-12-08 | End: 2024-01-07

## 2023-10-09 RX ORDER — TRAMADOL HYDROCHLORIDE 50 MG/1
50 TABLET ORAL EVERY 8 HOURS PRN
Qty: 90 TABLET | Refills: 0 | Status: SHIPPED | OUTPATIENT
Start: 2023-10-09 | End: 2023-11-08

## 2023-10-09 ASSESSMENT — FIBROSIS 4 INDEX: FIB4 SCORE: 0.85

## 2023-10-09 NOTE — PROGRESS NOTES
Subjective:     CC:   Chief Complaint   Patient presents with    Follow-Up     General check up      HPI:   Kasia presents today with the following:    Chronic neck pain  This is chronic condition. Patient is followed by physiatry.  Continues trigger point injections, has felt some relief.  Initially injury in , after diving into the pool from a trampoline. She remembers a surgeon at that time told her that her injury was nonoperable and she has managed this conservatively.  Currently taking tramadol 50 mg TID with relief.      She  reports current alcohol use.  She  reports no history of drug use.     Any major change in health since last appointment? No     Consequences of Chronic Opiate therapy:  (5 A's)  Analgesia: Compared to no treatment or prior treatment, pain is currently not changed  Activity: not changed  Adverse Events: She denies constipation, dry mouth, itchy skin, nausea and sedation  Aberrant Behaviors: She reports she is taking medication as prescribed and is not veering from agreed treatment regimen or provider recommendations. There have been no inappropriate refills or lost/stolen meds reported.  Affect/Mood: Pain is impacting patient's mood.  Patient denies depression/anxiety.     Nonnarcotic treatments that are being used: Tylenol, NSAIDs/PATEL-2 and muscle relaxers.      Last imagin2022     Opioid Risk Score: 0     Interpretation of Opioid Risk Score   Score 0-3 = Low risk of abuse. Do UDS at least once per year.  Score 4-7 = Moderate risk of abuse. Do UDS 1-4 times per year.  Score 8+ = High risk of abuse. Refer to specialist.     Last order of CONTROLLED SUBSTANCE TREATMENT AGREEMENT was found on 2022 from Office Visit on 2022            UDS Summary      This patient has no relevant Health Maintenance data.          I have reviewed the medical records, the Prescription Monitoring Program and I have determined that controlled substance treatment is medically indicated.  "    Primary hypertension  Stable. Currently taking atenolol and chlorthalidone as directed.   She is not taking baby aspirin daily.   She is monitoring BP at home.   Denies symptoms low BP: light-headed, tunnel-vision, unusual fatigue.   Denies symptoms high BP:pounding headache, visual changes, palpitations, flushed face.   Denies medicine side effects: unusual fatigue, slow heartbeat, foot/leg swelling, cough.    Prediabetes  A1c history:   Lab Results   Component Value Date/Time    HBA1C 5.9 (H) 07/11/2022 10:54 AM     Currently working on lifestyle modifications including diet and exercise. Denies any unexplained weight loss, polyuria, polydipsia.     Elevated LDL cholesterol level  Chronic condition. Due for labs. Patient currently managing with lifestyle modifications. The 10-year ASCVD risk score (Edmond HOOKER, et al., 2019) is: 2.1%.  Denies chest pain, shortness of breath, heart palpitations.    Lab Results   Component Value Date/Time    CHOLSTRLTOT 197 07/11/2022 10:54 AM     (H) 07/11/2022 10:54 AM    HDL 57 07/11/2022 10:54 AM    TRIGLYCERIDE 117 07/11/2022 10:54 AM       ROS:   Gen: no fevers/chills, no changes in weight  Eyes: no changes in vision  ENT: no sore throat, no hearing loss, no bloody nose  Pulm: no sob, no cough  CV: no chest pain, no palpitations  GI: no nausea/vomiting, no diarrhea  : no dysuria  MSk: no myalgias  Skin: no rash  Neuro: no headaches, no numbness/tingling  Heme/Lymph: no easy bruising        - NOTE: All other systems reviewed and are negative, except as in HPI.    Objective:     Exam: /70 (BP Location: Right arm, Patient Position: Sitting, BP Cuff Size: Large adult)   Pulse 90   Temp 37 °C (98.6 °F)   Resp 14   Ht 1.676 m (5' 6\")   Wt 116 kg (255 lb 3.2 oz)   SpO2 95%  Body mass index is 41.19 kg/m².    Constitutional: Alert, no distress, well-groomed.  Skin: Warm, dry, good turgor, no rashes in visible areas.  Eye: Equal, round and reactive, conjunctiva " clear, lids normal.  ENMT: Lips without lesions, good dentition, moist mucous membranes.  Neck: Trachea midline, no masses, no thyromegaly.  Respiratory: Unlabored respiratory effort, no cough. Clear to ausculation. No rales, ronchi, or wheezing.  Cardiovascular: Regular rate and rhythm without murmur. Carotid and radial pulses are intact and equal bilaterally.  MSK: Normal gait, moves all extremities.  Neuro: Grossly non-focal.   Psych: Alert and oriented x3, normal affect and mood.    Assessment & Plan:     52 y.o. female with the following -     1. Chronic neck pain  Controlled substance discussed with client. Client agrees to abide by controlled substance contract. UDS reviewed and appropriate. Discussed that patient is not to drive or operate machinery on this medicine, not to be used during working hours. Side effects of medication prescribed today were discussed with the patient including how to take the medications and proper dosage. Discussed repercussions of not taking the medication as prescribed. Instructed to call the office should she have any negative side effects or problems with the medication.    In prescribing controlled substances to this patient, I certify that I have obtained and reviewed the medical history of Kasia. I have also made a good star effort to obtain applicable records from other providers who have treated the patient and records did not demonstrate any increased risk of substance abuse that would prevent me from prescribing controlled substances.      I have conducted a physical exam and documented it. I have reviewed Kasia’s prescription history as maintained by the [place] Prescription Monitoring Program.      I have assessed the patient’s risk for abuse, dependency, and addiction using the validated Opioid Risk Tool available at https://www.mdcalc.com/gqkaho-wmwp-humm-ort-narcotic-abuse.      Given the above, I believe the benefits of controlled substance therapy outweigh the  risks. The reasons for prescribing controlled substances include non-narcotic, oral analgesic alternatives have been inadequate for pain control. Accordingly, I have discussed the risk and benefits, treatment plan, and alternative therapies with the patient.     Obtained and reviewed patient utilization report from [facility] pharmacy database on 10/9/2023 prior to writing prescription for controlled substance II, III or IV per [place] bill . Based on assessment of the report, the prescription is medically necessary.     - traMADol (ULTRAM) 50 MG Tab; Take 1 Tablet by mouth every 8 hours as needed for Moderate Pain for up to 30 days.  Dispense: 90 Tablet; Refill: 0  - traMADol (ULTRAM) 50 MG Tab; Take 1 Tablet by mouth every 8 hours as needed for Moderate Pain for up to 30 days.  Dispense: 90 Tablet; Refill: 0  - traMADol (ULTRAM) 50 MG Tab; Take 1 Tablet by mouth every 8 hours as needed for Moderate Pain for up to 30 days.  Dispense: 90 Tablet; Refill: 0    2. Elevated LDL cholesterol level  Chronic condition. Labs as indicated.  Continue lifestyle modifications.  - Comp Metabolic Panel; Future  - Lipid Profile; Future    3. Primary hypertension  Well-controlled on current regimen.  Labs as indicated.  Continue antihypertensive medications.  Discussed decreasing salt intake.  Emphasized benefits of exercise and diet.  - CBC WITH DIFFERENTIAL; Future  - TSH WITH REFLEX TO FT4; Future    4. Prediabetes  Recommend to reduce sugar/carbohydrate/alcohol, eat more vegetables and lean meats such as fish/chicken/turkey. Recommend 30 minutes of cardiovascular exercise most days of the week. Continue to monitor.  - Comp Metabolic Panel; Future  - HEMOGLOBIN A1C; Future    5. Screening for colorectal cancer  Cologuard ordered.   - COLOGUARD (FIT DNA)

## 2023-10-09 NOTE — ASSESSMENT & PLAN NOTE
Chronic condition. Due for labs. Patient currently managing with lifestyle modifications. The 10-year ASCVD risk score (Edmond HOOKER, et al., 2019) is: 2.1%.  Denies chest pain, shortness of breath, heart palpitations.    Lab Results   Component Value Date/Time    CHOLSTRLTOT 197 07/11/2022 10:54 AM     (H) 07/11/2022 10:54 AM    HDL 57 07/11/2022 10:54 AM    TRIGLYCERIDE 117 07/11/2022 10:54 AM

## 2023-10-09 NOTE — ASSESSMENT & PLAN NOTE
This is chronic condition. Patient is followed by physiatry.  Continues trigger point injections, has felt some relief.  Initially injury in , after diving into the pool from a trampoline. She remembers a surgeon at that time told her that her injury was nonoperable and she has managed this conservatively.  Currently taking tramadol 50 mg TID with relief.      She  reports current alcohol use.  She  reports no history of drug use.     Any major change in health since last appointment? No     Consequences of Chronic Opiate therapy:  (5 A's)  Analgesia: Compared to no treatment or prior treatment, pain is currently not changed  Activity: not changed  Adverse Events: She denies constipation, dry mouth, itchy skin, nausea and sedation  Aberrant Behaviors: She reports she is taking medication as prescribed and is not veering from agreed treatment regimen or provider recommendations. There have been no inappropriate refills or lost/stolen meds reported.  Affect/Mood: Pain is impacting patient's mood.  Patient denies depression/anxiety.     Nonnarcotic treatments that are being used: Tylenol, NSAIDs/PATEL-2 and muscle relaxers.      Last imagin2022     Opioid Risk Score: 0     Interpretation of Opioid Risk Score   Score 0-3 = Low risk of abuse. Do UDS at least once per year.  Score 4-7 = Moderate risk of abuse. Do UDS 1-4 times per year.  Score 8+ = High risk of abuse. Refer to specialist.     Last order of CONTROLLED SUBSTANCE TREATMENT AGREEMENT was found on 2022 from Office Visit on 2022            UDS Summary      This patient has no relevant Health Maintenance data.          I have reviewed the medical records, the Prescription Monitoring Program and I have determined that controlled substance treatment is medically indicated.

## 2023-10-09 NOTE — ASSESSMENT & PLAN NOTE
A1c history:   Lab Results   Component Value Date/Time    HBA1C 5.9 (H) 07/11/2022 10:54 AM     Currently working on lifestyle modifications including diet and exercise. Denies any unexplained weight loss, polyuria, polydipsia.

## 2023-11-10 ENCOUNTER — APPOINTMENT (RX ONLY)
Dept: URBAN - METROPOLITAN AREA CLINIC 15 | Facility: CLINIC | Age: 53
Setting detail: DERMATOLOGY
End: 2023-11-10

## 2023-11-10 DIAGNOSIS — L81.4 OTHER MELANIN HYPERPIGMENTATION: ICD-10-CM

## 2023-11-10 DIAGNOSIS — L82.1 OTHER SEBORRHEIC KERATOSIS: ICD-10-CM

## 2023-11-10 PROCEDURE — 99202 OFFICE O/P NEW SF 15 MIN: CPT

## 2023-11-10 PROCEDURE — ? COUNSELING

## 2023-11-10 ASSESSMENT — LOCATION SIMPLE DESCRIPTION DERM
LOCATION SIMPLE: LEFT POSTERIOR UPPER ARM
LOCATION SIMPLE: LEFT FOREARM
LOCATION SIMPLE: LEFT BREAST

## 2023-11-10 ASSESSMENT — LOCATION DETAILED DESCRIPTION DERM
LOCATION DETAILED: LEFT DISTAL DORSAL FOREARM
LOCATION DETAILED: LEFT PROXIMAL DORSAL FOREARM
LOCATION DETAILED: LEFT PROXIMAL LATERAL POSTERIOR UPPER ARM
LOCATION DETAILED: LEFT PROXIMAL POSTERIOR UPPER ARM
LOCATION DETAILED: LEFT MEDIAL BREAST 11-12:00 REGION

## 2023-11-10 ASSESSMENT — LOCATION ZONE DERM
LOCATION ZONE: TRUNK
LOCATION ZONE: ARM

## 2023-12-12 RX ORDER — TIZANIDINE 2 MG/1
1-2 TABLET ORAL NIGHTLY PRN
Qty: 90 TABLET | Refills: 0 | Status: SHIPPED | OUTPATIENT
Start: 2023-12-12

## 2023-12-12 NOTE — TELEPHONE ENCOUNTER
tizanidine (ZANAFLEX) 2 MG tablet [[id number]]     Received request via: Patient    Was the patient seen in the last year in this department? Yes    Does the patient have an active prescription (recently filled or refills available) for medication(s) requested? No    Does the patient have [facility] Plus and need 100 day supply (blood pressure, diabetes and cholesterol meds only)? Patient does not have SCP

## 2024-01-02 ENCOUNTER — APPOINTMENT (OUTPATIENT)
Dept: MEDICAL GROUP | Facility: LAB | Age: 54
End: 2024-01-02
Payer: COMMERCIAL

## 2024-01-26 NOTE — TELEPHONE ENCOUNTER
Received request via: Pharmacy    Was the patient seen in the last year in this department? Yes  10/9/23  Does the patient have an active prescription (recently filled or refills available) for medication(s) requested? No    Pharmacy Name: [facility]    Does the patient have skilled nursing Plus and need 100 day supply (blood pressure, diabetes and cholesterol meds only)? Medication is not for cholesterol, blood pressure or diabetes

## 2024-01-29 RX ORDER — ATENOLOL 100 MG/1
100 TABLET ORAL DAILY
Qty: 90 TABLET | Refills: 2 | Status: SHIPPED | OUTPATIENT
Start: 2024-01-29

## 2024-01-31 DIAGNOSIS — M54.2 CHRONIC NECK PAIN: ICD-10-CM

## 2024-01-31 DIAGNOSIS — G89.29 CHRONIC NECK PAIN: ICD-10-CM

## 2024-01-31 RX ORDER — METHOCARBAMOL 750 MG/1
750 TABLET, FILM COATED ORAL 4 TIMES DAILY
Qty: 120 TABLET | Refills: 1 | Status: SHIPPED | OUTPATIENT
Start: 2024-01-31

## 2024-01-31 RX ORDER — TRAMADOL HYDROCHLORIDE 50 MG/1
50 TABLET ORAL 3 TIMES DAILY PRN
Qty: 90 TABLET | Refills: 0 | Status: SHIPPED | OUTPATIENT
Start: 2024-01-31 | End: 2024-03-01

## 2024-01-31 NOTE — TELEPHONE ENCOUNTER
Received request via: Patient    Was the patient seen in the last year in this department? Yes  LOV : 10/9/2023   Does the patient have an active prescription (recently filled or refills available) for medication(s) requested? No    Pharmacy Name: [facility]     Does the patient have care home Plus and need 100 day supply (blood pressure, diabetes and cholesterol meds only)? Patient does not have SCP

## 2024-02-12 ENCOUNTER — APPOINTMENT (OUTPATIENT)
Dept: MEDICAL GROUP | Facility: LAB | Age: 54
End: 2024-02-12
Payer: COMMERCIAL

## 2024-02-29 ENCOUNTER — APPOINTMENT (OUTPATIENT)
Dept: MEDICAL GROUP | Facility: LAB | Age: 54
End: 2024-02-29
Payer: COMMERCIAL

## 2024-03-08 ENCOUNTER — HOSPITAL ENCOUNTER (OUTPATIENT)
Facility: MEDICAL CENTER | Age: 54
End: 2024-03-08
Attending: NURSE PRACTITIONER
Payer: COMMERCIAL

## 2024-03-08 ENCOUNTER — OFFICE VISIT (OUTPATIENT)
Dept: MEDICAL GROUP | Facility: LAB | Age: 54
End: 2024-03-08
Payer: COMMERCIAL

## 2024-03-08 DIAGNOSIS — M54.2 CHRONIC NECK PAIN: ICD-10-CM

## 2024-03-08 DIAGNOSIS — G89.29 CHRONIC NECK PAIN: ICD-10-CM

## 2024-03-08 PROCEDURE — G0480 DRUG TEST DEF 1-7 CLASSES: HCPCS

## 2024-03-08 PROCEDURE — 80307 DRUG TEST PRSMV CHEM ANLYZR: CPT

## 2024-03-08 PROCEDURE — 99213 OFFICE O/P EST LOW 20 MIN: CPT | Performed by: NURSE PRACTITIONER

## 2024-03-08 RX ORDER — TRAMADOL HYDROCHLORIDE 50 MG/1
50 TABLET ORAL EVERY 8 HOURS PRN
Qty: 90 TABLET | Refills: 0 | Status: SHIPPED | OUTPATIENT
Start: 2024-03-08 | End: 2024-04-07

## 2024-03-08 NOTE — PROGRESS NOTES
Subjective:     CC:   Chief Complaint   Patient presents with    Neck Pain            HPI:   Kasia presents today with the following:    Chronic neck pain  This is chronic condition. Patient is followed by physiatry.  Continues trigger point injections, has felt some relief.  Initially injury in , after diving into the pool from a trampoline. She remembers a surgeon at that time told her that her injury was nonoperable and she has managed this conservatively.  Currently taking tramadol 50 mg TID with relief.      She  reports current alcohol use.  She  reports no history of drug use.     Any major change in health since last appointment? No     Consequences of Chronic Opiate therapy:  (5 A's)  Analgesia: Compared to no treatment or prior treatment, pain is currently not changed  Activity: not changed  Adverse Events: She denies constipation, dry mouth, itchy skin, nausea and sedation  Aberrant Behaviors: She reports she is taking medication as prescribed and is not veering from agreed treatment regimen or provider recommendations. There have been no inappropriate refills or lost/stolen meds reported.  Affect/Mood: Pain is impacting patient's mood.  Patient denies depression/anxiety.     Nonnarcotic treatments that are being used: Tylenol, NSAIDs/PATEL-2 and muscle relaxers.      Last imagin2022     Opioid Risk Score: 0     Interpretation of Opioid Risk Score   Score 0-3 = Low risk of abuse. Do UDS at least once per year.  Score 4-7 = Moderate risk of abuse. Do UDS 1-4 times per year.  Score 8+ = High risk of abuse. Refer to specialist.     Last order of CONTROLLED SUBSTANCE TREATMENT AGREEMENT was found on 2022 from Office Visit on 2022            UDS Summary      This patient has no relevant Health Maintenance data.          I have reviewed the medical records, the Prescription Monitoring Program and I have determined that controlled substance treatment is medically indicated.     ROS:   Gen: no  fevers/chills, no changes in weight  Eyes: no changes in vision  ENT: no sore throat, no hearing loss, no bloody nose  Pulm: no sob, no cough  CV: no chest pain, no palpitations  GI: no nausea/vomiting, no diarrhea  : no dysuria  MSk: no myalgias  Skin: no rash  Neuro: no headaches, no numbness/tingling  Heme/Lymph: no easy bruising        - NOTE: All other systems reviewed and are negative, except as in HPI.    Objective:     Exam:    Constitutional: Alert, no distress, well-groomed.  Skin: Warm, dry, good turgor, no rashes in visible areas.  Eye: Equal, round and reactive, conjunctiva clear, lids normal.  ENMT: Lips without lesions, good dentition, moist mucous membranes.  Neck: Trachea midline, no masses, no thyromegaly.  Respiratory: Unlabored respiratory effort, no cough.  MSK: Normal gait, moves all extremities.  Neuro: Grossly non-focal.   Psych: Alert and oriented x3, normal affect and mood.    Assessment & Plan:     53 y.o. female with the following -     1. Chronic neck pain  Chronic condition. Controlled substance discussed with client. Client agrees to abide by controlled substance contract; signed today. UDS obtained today. Discussed that patient is not to drive or operate machinery on this medicine, not to be used during working hours. Side effects of medication prescribed today were discussed with the patient including how to take the medications and proper dosage. Discussed repercussions of not taking the medication as prescribed. Instructed to call the office should she have any negative side effects or problems with the medication.    In prescribing controlled substances to this patient, I certify that I have obtained and reviewed the medical history of Kasia. I have also made a good star effort to obtain applicable records from other providers who have treated the patient and records did not demonstrate any increased risk of substance abuse that would prevent me from prescribing controlled  substances.      I have conducted a physical exam and documented it. I have reviewed Kasia’s prescription history as maintained by the [place] Prescription Monitoring Program.      I have assessed the patient’s risk for abuse, dependency, and addiction using the validated Opioid Risk Tool available at https://www.mdcalc.com/rhswzs-sndf-ojli-ort-narcotic-abuse.      Given the above, I believe the benefits of controlled substance therapy outweigh the risks. The reasons for prescribing controlled substances include non-narcotic, oral analgesic alternatives have been inadequate for pain control. Accordingly, I have discussed the risk and benefits, treatment plan, and alternative therapies with the patient.     Obtained and reviewed patient utilization report from [facility] pharmacy database on 3/8/2024 prior to writing prescription for controlled substance II, III or IV per [place] bill . Based on assessment of the report, the prescription is medically necessary.     - PAIN MANAGEMENT PANEL, SCRN W/ RFLX TO QNT; Future  - Controlled Substance Treatment Agreement  - traMADol (ULTRAM) 50 MG Tab; Take 1 Tablet by mouth every 8 hours as needed for Severe Pain for up to 30 days.  Dispense: 90 Tablet; Refill: 0

## 2024-03-10 LAB
AMPHET CTO UR CFM-MCNC: NEGATIVE NG/ML
BARBITURATES CTO UR CFM-MCNC: NEGATIVE NG/ML
BENZODIAZ CTO UR CFM-MCNC: NEGATIVE NG/ML
BUPRENORPHINE UR-MCNC: NEGATIVE NG/ML
CANNABINOIDS CTO UR CFM-MCNC: NEGATIVE NG/ML
CARISOPRODOL UR-MCNC: NEGATIVE NG/ML
COCAINE CTO UR CFM-MCNC: NEGATIVE NG/ML
CREAT UR-MCNC: 39.1 MG/DL (ref 20–400)
DRUG SCREEN COMMENT UR-IMP: NORMAL
ETHYL GLUCURONIDE UR QL SCN: NEGATIVE NG/ML
FENTANYL UR-MCNC: NEGATIVE NG/ML
MEPERIDINE CTO UR SCN-MCNC: NEGATIVE NG/ML
METHADONE CTO UR CFM-MCNC: NEGATIVE NG/ML
OPIATES UR QL SCN: NEGATIVE NG/ML
OXYCDOXYM URSCRN 2005102: NEGATIVE NG/ML
PCP CTO UR CFM-MCNC: NEGATIVE NG/ML
PROPOXYPH CTO UR CFM-MCNC: NEGATIVE NG/ML
TAPENTADOL UR-MCNC: NEGATIVE NG/ML
TRAMADOL CTO UR SCN-MCNC: NORMAL NG/ML
ZOLPIDEM UR-MCNC: NEGATIVE NG/ML

## 2024-03-11 ENCOUNTER — APPOINTMENT (OUTPATIENT)
Dept: MEDICAL GROUP | Facility: LAB | Age: 54
End: 2024-03-11
Payer: COMMERCIAL

## 2024-03-14 LAB
NORTRAMADOL UR-MCNC: 2832 NG/ML
TRAMADOL UR-MCNC: 3699 NG/ML

## 2024-04-29 ENCOUNTER — PATIENT MESSAGE (OUTPATIENT)
Dept: MEDICAL GROUP | Facility: LAB | Age: 54
End: 2024-04-29
Payer: COMMERCIAL

## 2024-04-29 DIAGNOSIS — M54.2 CHRONIC NECK PAIN: ICD-10-CM

## 2024-04-29 DIAGNOSIS — G89.29 CHRONIC NECK PAIN: ICD-10-CM

## 2024-04-29 RX ORDER — TRAMADOL HYDROCHLORIDE 50 MG/1
50 TABLET ORAL EVERY 8 HOURS PRN
Qty: 90 TABLET | Refills: 0 | Status: SHIPPED | OUTPATIENT
Start: 2024-04-29 | End: 2024-05-29

## 2024-04-29 NOTE — PATIENT COMMUNICATION
Received request via: Patient    Was the patient seen in the last year in this department? Yes  LOV : 3/8/2024   Does the patient have an active prescription (recently filled or refills available) for medication(s) requested? No    [facility]     Does the patient have skilled nursing Plus and need 100 day supply (blood pressure, diabetes and cholesterol meds only)? Patient does not have SCP

## 2024-05-31 DIAGNOSIS — G89.29 CHRONIC NECK PAIN: ICD-10-CM

## 2024-05-31 DIAGNOSIS — M54.2 CHRONIC NECK PAIN: ICD-10-CM

## 2024-06-03 RX ORDER — TRAMADOL HYDROCHLORIDE 50 MG/1
50 TABLET ORAL EVERY 8 HOURS PRN
Qty: 90 TABLET | Refills: 0 | Status: SHIPPED | OUTPATIENT
Start: 2024-06-03 | End: 2024-07-03

## 2024-06-03 RX ORDER — FUROSEMIDE 40 MG/1
TABLET ORAL
OUTPATIENT
Start: 2024-06-03

## 2024-06-17 ENCOUNTER — HOSPITAL ENCOUNTER (OUTPATIENT)
Dept: LAB | Facility: MEDICAL CENTER | Age: 54
End: 2024-06-17
Attending: PHYSICIAN ASSISTANT
Payer: COMMERCIAL

## 2024-06-17 ENCOUNTER — OFFICE VISIT (OUTPATIENT)
Dept: MEDICAL GROUP | Facility: LAB | Age: 54
End: 2024-06-17
Payer: COMMERCIAL

## 2024-06-17 VITALS
DIASTOLIC BLOOD PRESSURE: 64 MMHG | TEMPERATURE: 98 F | SYSTOLIC BLOOD PRESSURE: 126 MMHG | HEART RATE: 64 BPM | WEIGHT: 256.4 LBS | HEIGHT: 66 IN | BODY MASS INDEX: 41.21 KG/M2 | OXYGEN SATURATION: 94 % | RESPIRATION RATE: 14 BRPM

## 2024-06-17 DIAGNOSIS — N95.1 VASOMOTOR SYMPTOMS DUE TO MENOPAUSE: ICD-10-CM

## 2024-06-17 DIAGNOSIS — E78.00 ELEVATED LDL CHOLESTEROL LEVEL: ICD-10-CM

## 2024-06-17 DIAGNOSIS — M54.2 CHRONIC NECK PAIN: ICD-10-CM

## 2024-06-17 DIAGNOSIS — R73.03 PREDIABETES: ICD-10-CM

## 2024-06-17 DIAGNOSIS — G89.29 CHRONIC NECK PAIN: ICD-10-CM

## 2024-06-17 DIAGNOSIS — I10 PRIMARY HYPERTENSION: ICD-10-CM

## 2024-06-17 DIAGNOSIS — R60.0 LOWER EXTREMITY EDEMA: ICD-10-CM

## 2024-06-17 LAB
ALBUMIN SERPL BCP-MCNC: 4.1 G/DL (ref 3.2–4.9)
ALBUMIN/GLOB SERPL: 1.1 G/DL
ALP SERPL-CCNC: 106 U/L (ref 30–99)
ALT SERPL-CCNC: 20 U/L (ref 2–50)
ANION GAP SERPL CALC-SCNC: 14 MMOL/L (ref 7–16)
AST SERPL-CCNC: 18 U/L (ref 12–45)
BASOPHILS # BLD AUTO: 0.7 % (ref 0–1.8)
BASOPHILS # BLD: 0.06 K/UL (ref 0–0.12)
BILIRUB SERPL-MCNC: 0.4 MG/DL (ref 0.1–1.5)
BUN SERPL-MCNC: 17 MG/DL (ref 8–22)
CALCIUM ALBUM COR SERPL-MCNC: 9.8 MG/DL (ref 8.5–10.5)
CALCIUM SERPL-MCNC: 9.9 MG/DL (ref 8.5–10.5)
CHLORIDE SERPL-SCNC: 102 MMOL/L (ref 96–112)
CHOLEST SERPL-MCNC: 179 MG/DL (ref 100–199)
CO2 SERPL-SCNC: 23 MMOL/L (ref 20–33)
CREAT SERPL-MCNC: 0.61 MG/DL (ref 0.5–1.4)
EOSINOPHIL # BLD AUTO: 0.21 K/UL (ref 0–0.51)
EOSINOPHIL NFR BLD: 2.4 % (ref 0–6.9)
ERYTHROCYTE [DISTWIDTH] IN BLOOD BY AUTOMATED COUNT: 44.6 FL (ref 35.9–50)
EST. AVERAGE GLUCOSE BLD GHB EST-MCNC: 126 MG/DL
FASTING STATUS PATIENT QL REPORTED: NORMAL
GFR SERPLBLD CREATININE-BSD FMLA CKD-EPI: 105 ML/MIN/1.73 M 2
GLOBULIN SER CALC-MCNC: 3.7 G/DL (ref 1.9–3.5)
GLUCOSE SERPL-MCNC: 105 MG/DL (ref 65–99)
HBA1C MFR BLD: 6 % (ref 4–5.6)
HCT VFR BLD AUTO: 47.3 % (ref 37–47)
HDLC SERPL-MCNC: 52 MG/DL
HGB BLD-MCNC: 15.1 G/DL (ref 12–16)
IMM GRANULOCYTES # BLD AUTO: 0.03 K/UL (ref 0–0.11)
IMM GRANULOCYTES NFR BLD AUTO: 0.3 % (ref 0–0.9)
LDLC SERPL CALC-MCNC: 105 MG/DL
LYMPHOCYTES # BLD AUTO: 2.46 K/UL (ref 1–4.8)
LYMPHOCYTES NFR BLD: 27.8 % (ref 22–41)
MCH RBC QN AUTO: 29.3 PG (ref 27–33)
MCHC RBC AUTO-ENTMCNC: 31.9 G/DL (ref 32.2–35.5)
MCV RBC AUTO: 91.7 FL (ref 81.4–97.8)
MONOCYTES # BLD AUTO: 0.64 K/UL (ref 0–0.85)
MONOCYTES NFR BLD AUTO: 7.2 % (ref 0–13.4)
NEUTROPHILS # BLD AUTO: 5.45 K/UL (ref 1.82–7.42)
NEUTROPHILS NFR BLD: 61.6 % (ref 44–72)
NRBC # BLD AUTO: 0 K/UL
NRBC BLD-RTO: 0 /100 WBC (ref 0–0.2)
PLATELET # BLD AUTO: 260 K/UL (ref 164–446)
PMV BLD AUTO: 9.9 FL (ref 9–12.9)
POTASSIUM SERPL-SCNC: 4.6 MMOL/L (ref 3.6–5.5)
PROT SERPL-MCNC: 7.8 G/DL (ref 6–8.2)
RBC # BLD AUTO: 5.16 M/UL (ref 4.2–5.4)
SODIUM SERPL-SCNC: 139 MMOL/L (ref 135–145)
TRIGL SERPL-MCNC: 109 MG/DL (ref 0–149)
TSH SERPL DL<=0.005 MIU/L-ACNC: 1.07 UIU/ML (ref 0.38–5.33)
WBC # BLD AUTO: 8.9 K/UL (ref 4.8–10.8)

## 2024-06-17 PROCEDURE — 3078F DIAST BP <80 MM HG: CPT | Performed by: NURSE PRACTITIONER

## 2024-06-17 PROCEDURE — 83036 HEMOGLOBIN GLYCOSYLATED A1C: CPT

## 2024-06-17 PROCEDURE — 3074F SYST BP LT 130 MM HG: CPT | Performed by: NURSE PRACTITIONER

## 2024-06-17 PROCEDURE — 36415 COLL VENOUS BLD VENIPUNCTURE: CPT

## 2024-06-17 PROCEDURE — 99214 OFFICE O/P EST MOD 30 MIN: CPT | Performed by: NURSE PRACTITIONER

## 2024-06-17 PROCEDURE — 80053 COMPREHEN METABOLIC PANEL: CPT

## 2024-06-17 PROCEDURE — 85025 COMPLETE CBC W/AUTO DIFF WBC: CPT

## 2024-06-17 PROCEDURE — 84443 ASSAY THYROID STIM HORMONE: CPT

## 2024-06-17 PROCEDURE — 80061 LIPID PANEL: CPT

## 2024-06-17 RX ORDER — TRAMADOL HYDROCHLORIDE 50 MG/1
50 TABLET ORAL EVERY 8 HOURS PRN
Qty: 90 TABLET | Refills: 0 | Status: SHIPPED | OUTPATIENT
Start: 2024-08-02 | End: 2024-09-01

## 2024-06-17 RX ORDER — TRAMADOL HYDROCHLORIDE 50 MG/1
50 TABLET ORAL EVERY 8 HOURS PRN
Qty: 90 TABLET | Refills: 0 | Status: SHIPPED | OUTPATIENT
Start: 2024-07-03 | End: 2024-08-02

## 2024-06-17 RX ORDER — FUROSEMIDE 20 MG/1
20 TABLET ORAL DAILY
Qty: 30 TABLET | Refills: 6 | Status: SHIPPED | OUTPATIENT
Start: 2024-06-17

## 2024-06-17 RX ORDER — PROGESTERONE 100 MG/1
100 CAPSULE ORAL DAILY
Qty: 90 CAPSULE | Refills: 3 | Status: SHIPPED | OUTPATIENT
Start: 2024-06-17

## 2024-06-17 RX ORDER — TRAMADOL HYDROCHLORIDE 50 MG/1
50 TABLET ORAL EVERY 8 HOURS PRN
Qty: 90 TABLET | Refills: 0 | Status: SHIPPED | OUTPATIENT
Start: 2024-09-01 | End: 2024-10-01

## 2024-06-17 RX ORDER — MEDROXYPROGESTERONE ACETATE 2.5 MG/1
2.5 TABLET ORAL DAILY
Qty: 90 TABLET | Refills: 3 | Status: CANCELLED | OUTPATIENT
Start: 2024-06-17

## 2024-06-17 ASSESSMENT — PATIENT HEALTH QUESTIONNAIRE - PHQ9: CLINICAL INTERPRETATION OF PHQ2 SCORE: 0

## 2024-06-17 ASSESSMENT — FIBROSIS 4 INDEX: FIB4 SCORE: 0.86

## 2024-06-17 NOTE — ASSESSMENT & PLAN NOTE
This is chronic condition. Patient is followed by physiatry.  Continues trigger point injections, has felt some relief.  Initially injury in , after diving into the pool from a trampoline. She remembers a surgeon at that time told her that her injury was nonoperable and she has managed this conservatively.  Currently taking tramadol 50 mg TID with relief.      She  reports current alcohol use.  She  reports no history of drug use.    Any major change in health since last appointment? No    Consequences of Chronic Opiate therapy:  (5 A's)  Analgesia: Compared to no treatment or prior treatment, pain is currently not changed  Activity: not changed  Adverse Events: She denies constipation, dry mouth, itchy skin, nausea, and sedation  Aberrant Behaviors: She reports she is taking medication as prescribed and is not veering from agreed treatment regimen or provider recommendations. There have been no inappropriate refills or lost/stolen meds reported.  Affect/Mood: Pain is impacting patient's mood.  Patient denies depression/anxiety.    Nonnarcotic treatments that are being used: Tylenol, NSAIDs/PATEL-2, muscle relaxers, physical therapy, ice, and heat.     Last imagin2022    Opioid Risk Score: 0    Interpretation of Opioid Risk Score   Score 0-3 = Low risk of abuse. Do UDS at least once per year.  Score 4-7 = Moderate risk of abuse. Do UDS 1-4 times per year.  Score 8+ = High risk of abuse. Refer to specialist.    Last order of CONTROLLED SUBSTANCE TREATMENT AGREEMENT was found on 3/8/2024 from Office Visit on 3/8/2024     UDS Summary       This patient has no relevant Health Maintenance data.          Most recent UDS reviewed today and is consistent with prescribed medications.     I have reviewed the medical records, the Prescription Monitoring Program and I have determined that controlled substance treatment is medically indicated.

## 2024-06-17 NOTE — PROGRESS NOTES
Subjective:     CC:   Chief Complaint   Patient presents with    Medication Refill    Hot Flashes     HPI:   Kasia presents today with the following:    Lower extremity edema  Reports that since going through menopause she has been retaining water, swelling in arms and legs. She has taken Lasix 20 mg PRN in the past and would like a refill of this medication.    Chronic neck pain  This is chronic condition. Patient is followed by physiatry.  Continues trigger point injections, has felt some relief.  Initially injury in , after diving into the pool from a trampoline. She remembers a surgeon at that time told her that her injury was nonoperable and she has managed this conservatively.  Currently taking tramadol 50 mg TID with relief.      She  reports current alcohol use.  She  reports no history of drug use.    Any major change in health since last appointment? No    Consequences of Chronic Opiate therapy:  (5 A's)  Analgesia: Compared to no treatment or prior treatment, pain is currently not changed  Activity: not changed  Adverse Events: She denies constipation, dry mouth, itchy skin, nausea, and sedation  Aberrant Behaviors: She reports she is taking medication as prescribed and is not veering from agreed treatment regimen or provider recommendations. There have been no inappropriate refills or lost/stolen meds reported.  Affect/Mood: Pain is impacting patient's mood.  Patient denies depression/anxiety.    Nonnarcotic treatments that are being used: Tylenol, NSAIDs/PATEL-2, muscle relaxers, physical therapy, ice, and heat.     Last imagin2022    Opioid Risk Score: 0    Interpretation of Opioid Risk Score   Score 0-3 = Low risk of abuse. Do UDS at least once per year.  Score 4-7 = Moderate risk of abuse. Do UDS 1-4 times per year.  Score 8+ = High risk of abuse. Refer to specialist.    Last order of CONTROLLED SUBSTANCE TREATMENT AGREEMENT was found on 3/8/2024 from Office Visit on 3/8/2024     UDS Summary   "     This patient has no relevant Health Maintenance data.          Most recent UDS reviewed today and is consistent with prescribed medications.     I have reviewed the medical records, the Prescription Monitoring Program and I have determined that controlled substance treatment is medically indicated.     Vasomotor symptoms due to menopause  Ongoing. Reports that she has been having worsening hot flashes, night sweats, low libido, and sleep disturbance. She has not had a period for several years. Has tried OTC supplements without relief. She does have a family history of breast cancer in her mother. Would like to try HRT at this time.    ROS:   Gen: no fevers/chills, no changes in weight  Eyes: no changes in vision  ENT: no sore throat, no hearing loss, no bloody nose  Pulm: no sob, no cough  CV: no chest pain, no palpitations  GI: no nausea/vomiting, no diarrhea  : no dysuria  MSk: no myalgias  Skin: no rash  Neuro: no headaches, no numbness/tingling  Heme/Lymph: no easy bruising        - NOTE: All other systems reviewed and are negative, except as in HPI.    Objective:     Exam: /64 (BP Location: Right arm, Patient Position: Sitting, BP Cuff Size: Large adult long)   Pulse 64   Temp 36.7 °C (98 °F)   Resp 14   Ht 1.676 m (5' 6\")   Wt 116 kg (256 lb 6.4 oz)   SpO2 94%  Body mass index is 41.38 kg/m².    Constitutional: Alert, no distress, well-groomed.  Skin: Warm, dry, good turgor, no rashes in visible areas.  Eye: Equal, round and reactive, conjunctiva clear, lids normal.  ENMT: Lips without lesions, good dentition, moist mucous membranes.  Neck: Trachea midline, no masses, no thyromegaly.  Respiratory: Unlabored respiratory effort, no cough.  MSK: Normal gait, moves all extremities.  Neuro: Grossly non-focal.   Psych: Alert and oriented x3, normal affect and mood.    Assessment & Plan:     53 y.o. female with the following -     1. Lower extremity edema  Chronic condition. Medication refilled. " Labs as indicated.   - furosemide (LASIX) 20 MG Tab; Take 1 Tablet by mouth every day.  Dispense: 30 Tablet; Refill: 6    2. Chronic neck pain  Controlled substance discussed with client. Client agrees to abide by controlled substance contract; signed today. UDS obtained today. Discussed that patient is not to drive or operate machinery on this medicine, not to be used during working hours. Side effects of medication prescribed today were discussed with the patient including how to take the medications and proper dosage. Discussed repercussions of not taking the medication as prescribed. Instructed to call the office should she have any negative side effects or problems with the medication.    In prescribing controlled substances to this patient, I certify that I have obtained and reviewed the medical history of Kasia. I have also made a good star effort to obtain applicable records from other providers who have treated the patient and records did not demonstrate any increased risk of substance abuse that would prevent me from prescribing controlled substances.      I have conducted a physical exam and documented it. I have reviewed Kasia’s prescription history as maintained by the [place] Prescription Monitoring Program.      I have assessed the patient’s risk for abuse, dependency, and addiction using the validated Opioid Risk Tool available at https://www.mdcalc.com/ljubvt-lhdm-qyfu-ort-narcotic-abuse.      Given the above, I believe the benefits of controlled substance therapy outweigh the risks. The reasons for prescribing controlled substances include non-narcotic, oral analgesic alternatives have been inadequate for pain control. Accordingly, I have discussed the risk and benefits, treatment plan, and alternative therapies with the patient.     - traMADol (ULTRAM) 50 MG Tab; Take 1 Tablet by mouth every 8 hours as needed for Severe Pain for up to 30 days.  Dispense: 90 Tablet; Refill: 0  - traMADol (ULTRAM) 50  MG Tab; Take 1 Tablet by mouth every 8 hours as needed for Severe Pain for up to 30 days.  Dispense: 90 Tablet; Refill: 0  - traMADol (ULTRAM) 50 MG Tab; Take 1 Tablet by mouth every 8 hours as needed for Severe Pain for up to 30 days.  Dispense: 90 Tablet; Refill: 0    3. Vasomotor symptoms due to menopause  Discussed various treatment options for menopause including natural remedies, non-hormonal prescription medications, and hormonal prescription medications.  Natural remedies include lifestyle modifications, avoiding caffeine, dressing in layers, fans, weight loss, exercise, soy products, and black cohosh.  Non-hormonal prescriptions include Brisdelle and SSRIs/antidepressants.  Hormonal prescriptions include estrogen/progesterone combined or estrogen alone.  We discussed risks of estrogen/progesterone HRT to include heart disease and stroke. Possible increased risk of breast cancer as well. HRT can have side effects including weight gain, bloating, breast tenderness or swelling, nausea, headaches, leg cramping, and vaginal bleeding.  HRT can prevent osteoporosis.  After thorough discussion of the available options, the patient elects for HRT.  Follow up in 2-3 months for re-evaluation of symptoms.

## 2024-06-17 NOTE — ASSESSMENT & PLAN NOTE
Reports that since going through menopause she has been retaining water, swelling in arms and legs. She has taken Lasix 20 mg PRN in the past and would like a refill of this medication.

## 2024-06-17 NOTE — ASSESSMENT & PLAN NOTE
Ongoing. Reports that she has been having worsening hot flashes, night sweats, low libido, and sleep disturbance. She has not had a period for several years. Has tried OTC supplements without relief. She does have a family history of breast cancer in her mother. Would like to try HRT at this time.

## 2024-08-05 ENCOUNTER — APPOINTMENT (OUTPATIENT)
Dept: MEDICAL GROUP | Facility: LAB | Age: 54
End: 2024-08-05
Payer: COMMERCIAL

## 2024-09-10 ENCOUNTER — PATIENT MESSAGE (OUTPATIENT)
Dept: MEDICAL GROUP | Facility: LAB | Age: 54
End: 2024-09-10
Payer: COMMERCIAL

## 2024-09-10 DIAGNOSIS — M54.2 CHRONIC NECK PAIN: ICD-10-CM

## 2024-09-10 DIAGNOSIS — G89.29 CHRONIC NECK PAIN: ICD-10-CM

## 2024-09-10 RX ORDER — TRAMADOL HYDROCHLORIDE 50 MG/1
50 TABLET ORAL EVERY 8 HOURS PRN
Qty: 90 TABLET | Refills: 0 | Status: SHIPPED | OUTPATIENT
Start: 2024-09-10 | End: 2024-10-10

## 2024-10-17 RX ORDER — TRAMADOL HYDROCHLORIDE 50 MG/1
TABLET ORAL
Qty: 90 TABLET | OUTPATIENT
Start: 2024-10-17

## 2024-10-18 ENCOUNTER — PATIENT MESSAGE (OUTPATIENT)
Dept: MEDICAL GROUP | Facility: LAB | Age: 54
End: 2024-10-18
Payer: COMMERCIAL

## 2024-10-18 DIAGNOSIS — M54.2 CHRONIC NECK PAIN: ICD-10-CM

## 2024-10-18 DIAGNOSIS — G89.29 CHRONIC NECK PAIN: ICD-10-CM

## 2024-10-21 RX ORDER — TRAMADOL HYDROCHLORIDE 50 MG/1
50 TABLET ORAL EVERY 8 HOURS PRN
Qty: 90 TABLET | Refills: 0 | Status: SHIPPED | OUTPATIENT
Start: 2024-10-21 | End: 2024-11-20

## 2024-11-04 RX ORDER — ATENOLOL 100 MG/1
100 TABLET ORAL DAILY
Qty: 90 TABLET | Refills: 2 | Status: SHIPPED | OUTPATIENT
Start: 2024-11-04

## 2024-11-05 NOTE — TELEPHONE ENCOUNTER
Received request via: Pharmacy    Was the patient seen in the last year in this department? Yes  LOV : 6/17/2024   Does the patient have an active prescription (recently filled or refills available) for medication(s) requested? No    [facility]     Does the patient have FDC Plus and need 100-day supply? (This applies to ALL medications) Patient does not have SCP

## 2024-11-11 ENCOUNTER — APPOINTMENT (OUTPATIENT)
Dept: MEDICAL GROUP | Facility: LAB | Age: 54
End: 2024-11-11
Payer: COMMERCIAL

## 2024-11-11 VITALS
SYSTOLIC BLOOD PRESSURE: 118 MMHG | DIASTOLIC BLOOD PRESSURE: 76 MMHG | HEART RATE: 66 BPM | OXYGEN SATURATION: 95 % | TEMPERATURE: 98.7 F | WEIGHT: 264.2 LBS | BODY MASS INDEX: 42.46 KG/M2 | HEIGHT: 66 IN | RESPIRATION RATE: 14 BRPM

## 2024-11-11 DIAGNOSIS — I10 PRIMARY HYPERTENSION: ICD-10-CM

## 2024-11-11 DIAGNOSIS — M54.2 CHRONIC NECK PAIN: ICD-10-CM

## 2024-11-11 DIAGNOSIS — E78.00 ELEVATED LDL CHOLESTEROL LEVEL: ICD-10-CM

## 2024-11-11 DIAGNOSIS — G89.29 CHRONIC NECK PAIN: ICD-10-CM

## 2024-11-11 DIAGNOSIS — Z12.31 ENCOUNTER FOR SCREENING MAMMOGRAM FOR BREAST CANCER: ICD-10-CM

## 2024-11-11 DIAGNOSIS — R73.03 PREDIABETES: ICD-10-CM

## 2024-11-11 PROCEDURE — 99214 OFFICE O/P EST MOD 30 MIN: CPT | Performed by: NURSE PRACTITIONER

## 2024-11-11 PROCEDURE — 3078F DIAST BP <80 MM HG: CPT | Performed by: NURSE PRACTITIONER

## 2024-11-11 PROCEDURE — 3074F SYST BP LT 130 MM HG: CPT | Performed by: NURSE PRACTITIONER

## 2024-11-11 RX ORDER — TRAMADOL HYDROCHLORIDE 50 MG/1
50 TABLET ORAL EVERY 8 HOURS PRN
Qty: 90 TABLET | Refills: 2 | Status: SHIPPED | OUTPATIENT
Start: 2024-11-20 | End: 2025-02-18

## 2024-11-11 ASSESSMENT — FIBROSIS 4 INDEX: FIB4 SCORE: 0.87

## 2024-11-11 NOTE — PROGRESS NOTES
Subjective:     CC:   Chief Complaint   Patient presents with    Follow-Up     Medications      HPI:   Kasia presents today with the following:    Chronic neck pain  This is chronic condition. Patient is followed by physiatry.  Continues trigger point injections, has felt some relief.  Initially injury in , after diving into the pool from a trampoline. She remembers a surgeon at that time told her that her injury was nonoperable and she has managed this conservatively.  Currently taking tramadol 50 mg TID with relief.      She  reports current alcohol use.  She  reports no history of drug use.     Any major change in health since last appointment? No     Consequences of Chronic Opiate therapy:  (5 A's)  Analgesia: Compared to no treatment or prior treatment, pain is currently not changed  Activity: not changed  Adverse Events: She denies constipation, dry mouth, itchy skin, nausea, and sedation  Aberrant Behaviors: She reports she is taking medication as prescribed and is not veering from agreed treatment regimen or provider recommendations. There have been no inappropriate refills or lost/stolen meds reported.  Affect/Mood: Pain is impacting patient's mood.  Patient denies depression/anxiety.     Nonnarcotic treatments that are being used: Tylenol, NSAIDs/PATEL-2, muscle relaxers, physical therapy, ice, and heat.      Last imagin2022     Opioid Risk Score: 0     Interpretation of Opioid Risk Score   Score 0-3 = Low risk of abuse. Do UDS at least once per year.  Score 4-7 = Moderate risk of abuse. Do UDS 1-4 times per year.  Score 8+ = High risk of abuse. Refer to specialist.     Last order of CONTROLLED SUBSTANCE TREATMENT AGREEMENT was found on 3/8/2024 from Office Visit on 3/8/2024      UDS Summary         This patient has no relevant Health Maintenance data.             Most recent UDS reviewed today and is consistent with prescribed medications.      I have reviewed the medical records, the Prescription  "Monitoring Program and I have determined that controlled substance treatment is medically indicated.     Elevated LDL cholesterol level  Chronic condition. Reviewed labs. Patient currently managing with lifestyle modifications. The 10-year ASCVD risk score (Edmond HOOKER, et al., 2019) is: 2.4%.  Denies chest pain, shortness of breath, heart palpitations.    Lab Results   Component Value Date/Time    CHOLSTRLTOT 179 06/17/2024 10:40 AM     (H) 06/17/2024 10:40 AM    HDL 52 06/17/2024 10:40 AM    TRIGLYCERIDE 109 06/17/2024 10:40 AM         Prediabetes  A1c history:   Lab Results   Component Value Date/Time    HBA1C 6.0 (H) 06/17/2024 10:40 AM    HBA1C 5.9 (H) 07/11/2022 10:54 AM     Currently working on lifestyle modifications including diet and exercise. Denies any unexplained weight loss, polyuria, polydipsia.     Primary hypertension  Stable. Currently taking atenolol and chlorthalidone as directed.   She is not taking baby aspirin daily.   She is monitoring BP at home.   Denies symptoms low BP: light-headed, tunnel-vision, unusual fatigue.   Denies symptoms high BP:pounding headache, visual changes, palpitations, flushed face.   Denies medicine side effects: unusual fatigue, slow heartbeat, foot/leg swelling, cough.    ROS:   Gen: no fevers/chills, no changes in weight  Eyes: no changes in vision  ENT: no sore throat, no hearing loss, no bloody nose  Pulm: no sob, no cough  CV: no chest pain, no palpitations  GI: no nausea/vomiting, no diarrhea  : no dysuria  MSk: no myalgias  Skin: no rash  Neuro: no headaches, no numbness/tingling  Heme/Lymph: no easy bruising        - NOTE: All other systems reviewed and are negative, except as in HPI.    Objective:     Exam: /76 (BP Location: Right arm, Patient Position: Sitting, BP Cuff Size: Large adult)   Pulse 66   Temp 37.1 °C (98.7 °F)   Resp 14   Ht 1.676 m (5' 6\")   Wt 120 kg (264 lb 3.2 oz)   SpO2 95%  Body mass index is 42.64 " kg/m².    Constitutional: Alert, no distress, well-groomed.  Skin: Warm, dry, good turgor, no rashes in visible areas.  Eye: Equal, round and reactive, conjunctiva clear, lids normal.  ENMT: Lips without lesions, good dentition, moist mucous membranes.  Neck: Trachea midline, no masses, no thyromegaly.  Respiratory: Unlabored respiratory effort, no cough.  MSK: Normal gait, moves all extremities.  Neuro: Grossly non-focal.   Psych: Alert and oriented x3, normal affect and mood.    Assessment & Plan:     54 y.o. female with the following -     1. Chronic neck pain  Chronic condition. Patient to continue medication as prescribed. Controlled substance discussed with client. Client agrees to abide by controlled substance contract. Discussed that patient is not to drive or operate machinery on this medicine, not to be used during working hours. Side effects of medication prescribed today were discussed with the patient including how to take the medications and proper dosage. Discussed repercussions of not taking the medication as prescribed. Instructed to call the office should she have any negative side effects or problems with the medication.    In prescribing controlled substances to this patient, I certify that I have obtained and reviewed the medical history of Kasia. I have also made a good star effort to obtain applicable records from other providers who have treated the patient and records did not demonstrate any increased risk of substance abuse that would prevent me from prescribing controlled substances.      I have conducted a physical exam and documented it. I have reviewed Kasia’s prescription history as maintained by the [place] Prescription Monitoring Program.      I have assessed the patient’s risk for abuse, dependency, and addiction using the validated Opioid Risk Tool available at https://www.mdcalc.com/jqudva-bbhp-vcwe-ort-narcotic-abuse.      Given the above, I believe the benefits of controlled  substance therapy outweigh the risks. The reasons for prescribing controlled substances include non-narcotic, oral analgesic alternatives have been inadequate for pain control. Accordingly, I have discussed the risk and benefits, treatment plan, and alternative therapies with the patient.     Obtained and reviewed patient utilization report from [facility] pharmacy database on 11/11/2024 prior to writing prescription for controlled substance II, III or IV per [place] bill . Based on assessment of the report, the prescription is medically necessary.     - traMADol (ULTRAM) 50 MG Tab; Take 1 Tablet by mouth every 8 hours as needed for Severe Pain for up to 90 days.  Dispense: 90 Tablet; Refill: 2    2. Primary hypertension  Well-controlled on current regimen.  Labs as indicated.  Continue antihypertensive medications.  Discussed decreasing salt intake.  Emphasized benefits of exercise and diet.  - CBC WITH DIFFERENTIAL; Future  - Comp Metabolic Panel; Future  - TSH WITH REFLEX TO FT4; Future    3. Prediabetes  Chronic condition. Recommend to reduce sugar/carbohydrate/alcohol, eat more vegetables and lean meats such as fish/chicken/turkey. Recommend 30 minutes of cardiovascular exercise most days of the week.  - Comp Metabolic Panel; Future  - HEMOGLOBIN A1C; Future    4. Elevated LDL cholesterol level  Chronic condition. Labs as indicated.  Continue lifestyle modifications.  - Comp Metabolic Panel; Future  - Lipid Profile; Future    5. Encounter for screening mammogram for breast cancer  Mammogram ordered.   - MA-SCREENING MAMMO BILAT W/TOMOSYNTHESIS W/CAD; Future

## 2024-11-11 NOTE — ASSESSMENT & PLAN NOTE
Chronic condition. Reviewed labs. Patient currently managing with lifestyle modifications. The 10-year ASCVD risk score (Edmond HOOKER, et al., 2019) is: 2.4%.  Denies chest pain, shortness of breath, heart palpitations.    Lab Results   Component Value Date/Time    CHOLSTRLTOT 179 06/17/2024 10:40 AM     (H) 06/17/2024 10:40 AM    HDL 52 06/17/2024 10:40 AM    TRIGLYCERIDE 109 06/17/2024 10:40 AM

## 2024-11-11 NOTE — ASSESSMENT & PLAN NOTE
A1c history:   Lab Results   Component Value Date/Time    HBA1C 6.0 (H) 06/17/2024 10:40 AM    HBA1C 5.9 (H) 07/11/2022 10:54 AM     Currently working on lifestyle modifications including diet and exercise. Denies any unexplained weight loss, polyuria, polydipsia.

## 2024-11-25 RX ORDER — CHLORTHALIDONE 25 MG/1
25 TABLET ORAL DAILY
Qty: 90 TABLET | Refills: 3 | Status: SHIPPED | OUTPATIENT
Start: 2024-11-25

## 2024-11-25 NOTE — TELEPHONE ENCOUNTER
Received request via: Pharmacy    Was the patient seen in the last year in this department? Yes  LOV : 11/11/2024   Does the patient have an active prescription (recently filled or refills available) for medication(s) requested? No    [facility] Name: [facility]     Does the patient have [facility] Plus and need 100-day supply? (This applies to ALL medications) Patient does not have SCP

## 2025-02-21 ENCOUNTER — HOSPITAL ENCOUNTER (OUTPATIENT)
Dept: LAB | Facility: MEDICAL CENTER | Age: 55
End: 2025-02-21
Attending: NURSE PRACTITIONER
Payer: COMMERCIAL

## 2025-02-21 DIAGNOSIS — R73.03 PREDIABETES: ICD-10-CM

## 2025-02-21 DIAGNOSIS — I10 PRIMARY HYPERTENSION: ICD-10-CM

## 2025-02-21 DIAGNOSIS — E78.00 ELEVATED LDL CHOLESTEROL LEVEL: ICD-10-CM

## 2025-02-21 LAB
ALBUMIN SERPL BCP-MCNC: 4.2 G/DL (ref 3.2–4.9)
ALBUMIN/GLOB SERPL: 1.2 G/DL
ALP SERPL-CCNC: 79 U/L (ref 30–99)
ALT SERPL-CCNC: 22 U/L (ref 2–50)
ANION GAP SERPL CALC-SCNC: 13 MMOL/L (ref 7–16)
AST SERPL-CCNC: 23 U/L (ref 12–45)
BASOPHILS # BLD AUTO: 0.6 % (ref 0–1.8)
BASOPHILS # BLD: 0.05 K/UL (ref 0–0.12)
BILIRUB SERPL-MCNC: 0.4 MG/DL (ref 0.1–1.5)
BUN SERPL-MCNC: 15 MG/DL (ref 8–22)
CALCIUM ALBUM COR SERPL-MCNC: 9.5 MG/DL (ref 8.5–10.5)
CALCIUM SERPL-MCNC: 9.7 MG/DL (ref 8.5–10.5)
CHLORIDE SERPL-SCNC: 98 MMOL/L (ref 96–112)
CHOLEST SERPL-MCNC: 183 MG/DL (ref 100–199)
CO2 SERPL-SCNC: 28 MMOL/L (ref 20–33)
CREAT SERPL-MCNC: 0.71 MG/DL (ref 0.5–1.4)
EOSINOPHIL # BLD AUTO: 0.17 K/UL (ref 0–0.51)
EOSINOPHIL NFR BLD: 2 % (ref 0–6.9)
ERYTHROCYTE [DISTWIDTH] IN BLOOD BY AUTOMATED COUNT: 43 FL (ref 35.9–50)
EST. AVERAGE GLUCOSE BLD GHB EST-MCNC: 131 MG/DL
GFR SERPLBLD CREATININE-BSD FMLA CKD-EPI: 100 ML/MIN/1.73 M 2
GLOBULIN SER CALC-MCNC: 3.6 G/DL (ref 1.9–3.5)
GLUCOSE SERPL-MCNC: 112 MG/DL (ref 65–99)
HBA1C MFR BLD: 6.2 % (ref 4–5.6)
HCT VFR BLD AUTO: 45.1 % (ref 37–47)
HDLC SERPL-MCNC: 52 MG/DL
HGB BLD-MCNC: 14.8 G/DL (ref 12–16)
IMM GRANULOCYTES # BLD AUTO: 0.03 K/UL (ref 0–0.11)
IMM GRANULOCYTES NFR BLD AUTO: 0.4 % (ref 0–0.9)
LDLC SERPL CALC-MCNC: 112 MG/DL
LYMPHOCYTES # BLD AUTO: 2.28 K/UL (ref 1–4.8)
LYMPHOCYTES NFR BLD: 27.4 % (ref 22–41)
MCH RBC QN AUTO: 29.7 PG (ref 27–33)
MCHC RBC AUTO-ENTMCNC: 32.8 G/DL (ref 32.2–35.5)
MCV RBC AUTO: 90.4 FL (ref 81.4–97.8)
MONOCYTES # BLD AUTO: 0.68 K/UL (ref 0–0.85)
MONOCYTES NFR BLD AUTO: 8.2 % (ref 0–13.4)
NEUTROPHILS # BLD AUTO: 5.1 K/UL (ref 1.82–7.42)
NEUTROPHILS NFR BLD: 61.4 % (ref 44–72)
NRBC # BLD AUTO: 0 K/UL
NRBC BLD-RTO: 0 /100 WBC (ref 0–0.2)
PLATELET # BLD AUTO: 248 K/UL (ref 164–446)
PMV BLD AUTO: 10.2 FL (ref 9–12.9)
POTASSIUM SERPL-SCNC: 3.2 MMOL/L (ref 3.6–5.5)
PROT SERPL-MCNC: 7.8 G/DL (ref 6–8.2)
RBC # BLD AUTO: 4.99 M/UL (ref 4.2–5.4)
SODIUM SERPL-SCNC: 139 MMOL/L (ref 135–145)
TRIGL SERPL-MCNC: 96 MG/DL (ref 0–149)
TSH SERPL DL<=0.005 MIU/L-ACNC: 0.77 UIU/ML (ref 0.38–5.33)
WBC # BLD AUTO: 8.3 K/UL (ref 4.8–10.8)

## 2025-02-21 PROCEDURE — 80053 COMPREHEN METABOLIC PANEL: CPT

## 2025-02-21 PROCEDURE — 80061 LIPID PANEL: CPT

## 2025-02-21 PROCEDURE — 83036 HEMOGLOBIN GLYCOSYLATED A1C: CPT

## 2025-02-21 PROCEDURE — 36415 COLL VENOUS BLD VENIPUNCTURE: CPT

## 2025-02-21 PROCEDURE — 85025 COMPLETE CBC W/AUTO DIFF WBC: CPT

## 2025-02-21 PROCEDURE — 84443 ASSAY THYROID STIM HORMONE: CPT

## 2025-02-25 ENCOUNTER — RESULTS FOLLOW-UP (OUTPATIENT)
Dept: MEDICAL GROUP | Facility: LAB | Age: 55
End: 2025-02-25
Payer: COMMERCIAL

## 2025-02-25 DIAGNOSIS — E87.6 HYPOKALEMIA: ICD-10-CM

## 2025-02-25 RX ORDER — POTASSIUM CHLORIDE 1500 MG/1
20 TABLET, EXTENDED RELEASE ORAL 2 TIMES DAILY
Qty: 60 TABLET | Refills: 11 | Status: SHIPPED | OUTPATIENT
Start: 2025-02-25 | End: 2025-02-25

## 2025-02-25 RX ORDER — POTASSIUM CHLORIDE 1500 MG/1
20 TABLET, EXTENDED RELEASE ORAL 2 TIMES DAILY
Qty: 180 TABLET | Refills: 3 | Status: SHIPPED | OUTPATIENT
Start: 2025-02-25

## 2025-04-03 ENCOUNTER — OFFICE VISIT (OUTPATIENT)
Dept: MEDICAL GROUP | Facility: LAB | Age: 55
End: 2025-04-03
Payer: COMMERCIAL

## 2025-04-03 ENCOUNTER — HOSPITAL ENCOUNTER (OUTPATIENT)
Facility: MEDICAL CENTER | Age: 55
End: 2025-04-03
Attending: NURSE PRACTITIONER
Payer: COMMERCIAL

## 2025-04-03 VITALS
BODY MASS INDEX: 41.56 KG/M2 | DIASTOLIC BLOOD PRESSURE: 76 MMHG | HEIGHT: 66 IN | OXYGEN SATURATION: 95 % | RESPIRATION RATE: 14 BRPM | TEMPERATURE: 99.4 F | HEART RATE: 60 BPM | SYSTOLIC BLOOD PRESSURE: 128 MMHG | WEIGHT: 258.6 LBS

## 2025-04-03 DIAGNOSIS — G89.29 CHRONIC NECK PAIN: ICD-10-CM

## 2025-04-03 DIAGNOSIS — E78.00 ELEVATED LDL CHOLESTEROL LEVEL: ICD-10-CM

## 2025-04-03 DIAGNOSIS — R73.03 PREDIABETES: ICD-10-CM

## 2025-04-03 DIAGNOSIS — M54.2 CHRONIC NECK PAIN: ICD-10-CM

## 2025-04-03 DIAGNOSIS — Z12.31 ENCOUNTER FOR SCREENING MAMMOGRAM FOR BREAST CANCER: ICD-10-CM

## 2025-04-03 PROCEDURE — 3074F SYST BP LT 130 MM HG: CPT | Performed by: NURSE PRACTITIONER

## 2025-04-03 PROCEDURE — 3078F DIAST BP <80 MM HG: CPT | Performed by: NURSE PRACTITIONER

## 2025-04-03 PROCEDURE — 99214 OFFICE O/P EST MOD 30 MIN: CPT | Performed by: NURSE PRACTITIONER

## 2025-04-03 RX ORDER — TRAMADOL HYDROCHLORIDE 50 MG/1
50 TABLET ORAL EVERY 8 HOURS PRN
Qty: 90 TABLET | Refills: 2 | Status: SHIPPED | OUTPATIENT
Start: 2025-04-03 | End: 2025-07-02

## 2025-04-03 RX ORDER — TRAMADOL HYDROCHLORIDE 50 MG/1
TABLET ORAL
COMMUNITY
Start: 2025-03-29 | End: 2025-04-03

## 2025-04-03 ASSESSMENT — FIBROSIS 4 INDEX: FIB4 SCORE: 1.11

## 2025-04-03 NOTE — PROGRESS NOTES
"Chief Complaint   Patient presents with    Medication Follow-up     Verbal consent was acquired by the patient to use [employer] ambient listening note generation during this visit Yes      HPI:  History of Present Illness  The patient presents for a checkup and medication refill.    She reports an overall improvement in her health status, attributing this to the hormone therapy she has been receiving. She is not experiencing any vaginal bleeding. Her last mammogram was conducted approximately 1 year ago, and her most recent Pap smear, performed 2 years ago, yielded normal results.    She has been on tramadol and picked up her last refill about a week ago. She is seeking a renewal of her tramadol prescription.    Consequences of Chronic Opiate therapy:  (5 A's)  Analgesia: Compared to no treatment or prior treatment, pain is currently worse.  Activity: not changed  Adverse Events: denies constipation, dry mouth, itchy skin, nausea and sedation  Aberrant Behaviors: Taking medication as prescribed and not veering from agreed treatment regimen or provider recommendations. There have been no inappropriate refills or lost/stolen meds reported.  Affect/Mood: Pain is always impacts patient's mood.  Patient denies depression but has chronic/worsening anxiety.  Controlled substance agreement form signed today.  UDS obtained today.     I have reviewed the medical records, the Prescription Monitoring Program and I have determined that controlled substance treatment is medically indicated.     MEDICATIONS  tramadol, potassium    Results:  Results  Laboratory Studies  LDL is elevated.    ROS:  As documented in history of present illness above    Exam:   /76 (BP Location: Right arm, Patient Position: Sitting, BP Cuff Size: Adult long)   Pulse 60   Temp 37.4 °C (99.4 °F)   Resp 14   Ht 1.676 m (5' 6\")   Wt 117 kg (258 lb 9.6 oz)   SpO2 95%   BMI 41.74 kg/m²     Constitutional: Alert, no distress, well-groomed.  Skin: " Warm, dry, good turgor, no rashes in visible areas.  Eye: Equal, round and reactive, conjunctiva clear, lids normal.  ENMT: Lips without lesions, good dentition, moist mucous membranes.  Neck: Trachea midline, no masses, no thyromegaly.  Respiratory: Unlabored respiratory effort, no cough.  MSK: Normal gait, moves all extremities.  Neuro: Grossly non-focal.   Psych: Alert and oriented x3, normal affect and mood.    Assessment / Plan:  Assessment & Plan  1.  Chronic neck pain  She requested a refill for tramadol. A 90-day supply of tramadol will be sent to [facility] in [place]. Controlled substance discussed with client. Client agrees to abide by controlled substance contract; signed today. UDS obtained today. Discussed that patient is not to drive or operate machinery on this medicine, not to be used during working hours. Side effects of medication prescribed today were discussed with the patient including how to take the medications and proper dosage. Discussed repercussions of not taking the medication as prescribed. Instructed to call the office should she have any negative side effects or problems with the medication.    In prescribing controlled substances to this patient, I certify that I have obtained and reviewed the medical history of Kasia. I have also made a good star effort to obtain applicable records from other providers who have treated the patient and records did not demonstrate any increased risk of substance abuse that would prevent me from prescribing controlled substances.      I have conducted a physical exam and documented it. I have reviewed Kasia’s prescription history as maintained by the [place] Prescription Monitoring Program.      I have assessed the patient’s risk for abuse, dependency, and addiction using the validated Opioid Risk Tool available at https://www.mdcalc.com/ydaeax-cunc-eedb-ort-narcotic-abuse.      Given the above, I believe the benefits of controlled substance therapy  outweigh the risks. The reasons for prescribing controlled substances include non-narcotic, oral analgesic alternatives have been inadequate for pain control. Accordingly, I have discussed the risk and benefits, treatment plan, and alternative therapies with the patient.     Obtained and reviewed patient utilization report from [facility] pharmacy database on 4/3/2025 prior to writing prescription for controlled substance II, III or IV per [place] bill . Based on assessment of the report, the prescription is medically necessary.     2.  Prediabetes.  Chronic condition. Recommend to reduce sugar/carbohydrate/alcohol, eat more vegetables and lean meats such as fish/chicken/turkey. Recommend 30 minutes of cardiovascular exercise most days of the week.    3.  Elevated LDL cholesterol  Chronic condition. Labs as indicated.  A CT cardiac score was discussed but deferred due to insurance coverage issues.  Continue lifestyle modifications.    1. Health Maintenance.  She is due for a mammogram and Pap smear. A mammogram has been ordered. She will undergo laboratory tests, including A1c, complete metabolic panel, and lipid profile, in late May or early June.    Please note that this dictation was created using voice recognition software. I have made every reasonable attempt to correct obvious errors, but I expect that there are errors of grammar and possibly content that I did not discover before finalizing the note.

## 2025-04-04 ENCOUNTER — TELEPHONE (OUTPATIENT)
Dept: MEDICAL GROUP | Facility: LAB | Age: 55
End: 2025-04-04
Payer: COMMERCIAL

## 2025-04-04 DIAGNOSIS — M54.2 CHRONIC NECK PAIN: ICD-10-CM

## 2025-04-04 DIAGNOSIS — G89.29 CHRONIC NECK PAIN: ICD-10-CM

## 2025-04-04 NOTE — TELEPHONE ENCOUNTER
Lab called and LVM reporting re collection needed request call back to [phone]     The pain management screening did not have enough urine to run the sample please advise reordering lab and sending patient to lab for collection.    Excision Method: Fusiform

## 2025-07-14 DIAGNOSIS — N95.1 VASOMOTOR SYMPTOMS DUE TO MENOPAUSE: ICD-10-CM

## 2025-07-14 RX ORDER — PROGESTERONE 100 MG/1
100 CAPSULE ORAL DAILY
Qty: 90 CAPSULE | Refills: 3 | Status: SHIPPED | OUTPATIENT
Start: 2025-07-14

## 2025-07-21 DIAGNOSIS — N95.1 VASOMOTOR SYMPTOMS DUE TO MENOPAUSE: ICD-10-CM

## 2025-07-22 NOTE — TELEPHONE ENCOUNTER
Received request via: Pharmacy    Was the patient seen in the last year in this department? Yes    Does the patient have an active prescription (recently filled or refills available) for medication(s) requested? No    Pharmacy Name: [id number] - [place]  [address] [facility] AT [facility]     Does the patient have [facility] Plus and need 100-day supply? (This applies to ALL medications) Patient does not have SCP

## 2025-08-26 ENCOUNTER — HOSPITAL ENCOUNTER (OUTPATIENT)
Dept: LAB | Facility: MEDICAL CENTER | Age: 55
End: 2025-08-26
Attending: NURSE PRACTITIONER
Payer: COMMERCIAL

## 2025-08-26 ENCOUNTER — APPOINTMENT (OUTPATIENT)
Dept: MEDICAL GROUP | Facility: LAB | Age: 55
End: 2025-08-26
Payer: COMMERCIAL

## 2025-08-26 DIAGNOSIS — E78.00 ELEVATED LDL CHOLESTEROL LEVEL: ICD-10-CM

## 2025-08-26 DIAGNOSIS — M54.2 CHRONIC NECK PAIN: ICD-10-CM

## 2025-08-26 DIAGNOSIS — R73.03 PREDIABETES: ICD-10-CM

## 2025-08-26 DIAGNOSIS — G89.29 CHRONIC NECK PAIN: ICD-10-CM

## 2025-08-26 LAB
ALBUMIN SERPL BCP-MCNC: 4.2 G/DL (ref 3.2–4.9)
ALBUMIN/GLOB SERPL: 1.3 G/DL
ALP SERPL-CCNC: 85 U/L (ref 30–99)
ALT SERPL-CCNC: 19 U/L (ref 2–50)
ANION GAP SERPL CALC-SCNC: 11 MMOL/L (ref 7–16)
AST SERPL-CCNC: 22 U/L (ref 12–45)
BILIRUB SERPL-MCNC: 0.4 MG/DL (ref 0.1–1.5)
BUN SERPL-MCNC: 15 MG/DL (ref 8–22)
CALCIUM ALBUM COR SERPL-MCNC: 9.2 MG/DL (ref 8.5–10.5)
CALCIUM SERPL-MCNC: 9.4 MG/DL (ref 8.5–10.5)
CHLORIDE SERPL-SCNC: 100 MMOL/L (ref 96–112)
CHOLEST SERPL-MCNC: 176 MG/DL (ref 100–199)
CO2 SERPL-SCNC: 27 MMOL/L (ref 20–33)
CREAT SERPL-MCNC: 0.71 MG/DL (ref 0.5–1.4)
EST. AVERAGE GLUCOSE BLD GHB EST-MCNC: 140 MG/DL
GFR SERPLBLD CREATININE-BSD FMLA CKD-EPI: 99 ML/MIN/1.73 M 2
GLOBULIN SER CALC-MCNC: 3.2 G/DL (ref 1.9–3.5)
GLUCOSE SERPL-MCNC: 120 MG/DL (ref 65–99)
HBA1C MFR BLD: 6.5 % (ref 4–5.6)
HDLC SERPL-MCNC: 47 MG/DL
LDLC SERPL CALC-MCNC: 92 MG/DL
POTASSIUM SERPL-SCNC: 3.6 MMOL/L (ref 3.6–5.5)
PROT SERPL-MCNC: 7.4 G/DL (ref 6–8.2)
SODIUM SERPL-SCNC: 138 MMOL/L (ref 135–145)
TRIGL SERPL-MCNC: 186 MG/DL (ref 0–149)

## 2025-08-26 PROCEDURE — 36415 COLL VENOUS BLD VENIPUNCTURE: CPT

## 2025-08-26 PROCEDURE — 80053 COMPREHEN METABOLIC PANEL: CPT

## 2025-08-26 PROCEDURE — 83036 HEMOGLOBIN GLYCOSYLATED A1C: CPT

## 2025-08-26 PROCEDURE — 80061 LIPID PANEL: CPT

## 2025-08-26 PROCEDURE — G0482 DRUG TEST DEF 15-21 CLASSES: HCPCS

## 2025-08-26 ASSESSMENT — PATIENT HEALTH QUESTIONNAIRE - PHQ9: CLINICAL INTERPRETATION OF PHQ2 SCORE: 0

## 2025-08-26 ASSESSMENT — FIBROSIS 4 INDEX: FIB4 SCORE: 1.11

## 2025-08-29 LAB
1OH-MIDAZOLAM UR QL SCN: NOT DETECTED
6MAM UR QL: NOT DETECTED
7AMINOCLONAZEPAM UR QL: NOT DETECTED
A-OH ALPRAZ UR QL: NOT DETECTED
ALPRAZ UR QL: NOT DETECTED
AMPHET UR QL SCN: NOT DETECTED
ANNOTATION COMMENT IMP: ABNORMAL
BARBITURATES UR QL: NEGATIVE
BUPRENORPHINE UR QL: NOT DETECTED
BZE UR QL: NEGATIVE
CARBOXYTHC UR QL: NEGATIVE
CARISOPRODOL UR QL: NEGATIVE
CLONAZEPAM UR QL: NOT DETECTED
CODEINE UR QL: NOT DETECTED
CREAT UR-MCNC: 50.4 MG/DL (ref 20–400)
DIAZEPAM UR QL: NOT DETECTED
ETHYL GLUCURONIDE UR QL: NEGATIVE
FENTANYL UR QL: NOT DETECTED
GABAPENTIN UR QL CFM: PRESENT
HYDROCODONE UR QL: NOT DETECTED
HYDROMORPHONE UR QL: NOT DETECTED
LORAZEPAM UR QL: NOT DETECTED
MDA UR QL: NOT DETECTED
MDEA UR QL: NOT DETECTED
MDMA UR QL: NOT DETECTED
ME-PHENIDATE UR QL: NOT DETECTED
METHADONE UR QL: NEGATIVE
METHAMPHET UR QL: NOT DETECTED
MIDAZOLAM UR QL SCN: NOT DETECTED
MORPHINE UR QL: NOT DETECTED
NALOXONE UR QL CFM: NOT DETECTED
NORBUPRENORPHINE UR QL CFM: NOT DETECTED
NORDIAZEPAM UR QL: NOT DETECTED
NORFENTANYL UR QL: NOT DETECTED
NORHYDROCODONE UR QL CFM: NOT DETECTED
NORMEPERIDINE UR QL CFM: NOT DETECTED
NOROXYCODONE UR QL CFM: NOT DETECTED
NOROXYMORPHONE UR QL SCN: NOT DETECTED
OXAZEPAM UR QL: NOT DETECTED
OXYCODONE UR QL: NOT DETECTED
OXYMORPHONE UR QL: NOT DETECTED
PATHOLOGY STUDY: ABNORMAL
PCP UR QL: NEGATIVE
PHENTERMINE UR QL: NOT DETECTED
PREGABALIN UR QL CFM: NOT DETECTED
SERVICE CMNT-IMP: ABNORMAL
TAPENTADOL UR QL SCN: NOT DETECTED
TAPENTADOL UR QL SCN: NOT DETECTED
TEMAZEPAM UR QL: NOT DETECTED
TRAMADOL UR QL: ABNORMAL
ZOLPIDEM PHENYL-4-CARB UR QL SCN: NOT DETECTED
ZOLPIDEM UR QL: NOT DETECTED